# Patient Record
Sex: FEMALE | Race: WHITE | Employment: UNEMPLOYED | URBAN - METROPOLITAN AREA
[De-identification: names, ages, dates, MRNs, and addresses within clinical notes are randomized per-mention and may not be internally consistent; named-entity substitution may affect disease eponyms.]

---

## 2017-01-24 RX ORDER — LABETALOL 100 MG/1
TABLET, FILM COATED ORAL
Qty: 56 TABLET | Refills: 0 | Status: SHIPPED | OUTPATIENT
Start: 2017-01-24 | End: 2017-02-21 | Stop reason: SDUPTHER

## 2017-02-13 RX ORDER — LORATADINE AND PSEUDOEPHEDRINE SULFATE 10; 240 MG/1; MG/1
TABLET, FILM COATED, EXTENDED RELEASE ORAL
Qty: 30 TABLET | Refills: 4 | Status: SHIPPED | OUTPATIENT
Start: 2017-02-13

## 2017-02-21 RX ORDER — ZOLPIDEM TARTRATE 10 MG/1
TABLET ORAL
Qty: 28 TABLET | Refills: 3 | Status: SHIPPED | OUTPATIENT
Start: 2017-02-21 | End: 2017-04-12 | Stop reason: SDUPTHER

## 2017-02-21 RX ORDER — LABETALOL 100 MG/1
TABLET, FILM COATED ORAL
Qty: 56 TABLET | Refills: 5 | Status: ON HOLD | OUTPATIENT
Start: 2017-02-21 | End: 2022-06-30 | Stop reason: HOSPADM

## 2017-02-22 RX ORDER — ALPRAZOLAM 2 MG/1
TABLET, EXTENDED RELEASE ORAL
Qty: 28 TABLET | Refills: 2 | Status: SHIPPED | OUTPATIENT
Start: 2017-02-22 | End: 2019-09-01

## 2017-04-05 ENCOUNTER — TELEPHONE (OUTPATIENT)
Dept: INTERNAL MEDICINE CLINIC | Age: 82
End: 2017-04-05

## 2017-04-12 RX ORDER — ZOLPIDEM TARTRATE 10 MG/1
TABLET ORAL
Qty: 28 TABLET | Refills: 3 | Status: SHIPPED | OUTPATIENT
Start: 2017-04-12 | End: 2020-07-28 | Stop reason: CLARIF

## 2017-04-19 ASSESSMENT — ENCOUNTER SYMPTOMS
ABDOMINAL PAIN: 0
SHORTNESS OF BREATH: 0

## 2017-04-21 ENCOUNTER — OFFICE VISIT (OUTPATIENT)
Dept: INTERNAL MEDICINE CLINIC | Age: 82
End: 2017-04-21

## 2017-04-21 VITALS
BODY MASS INDEX: 30 KG/M2 | SYSTOLIC BLOOD PRESSURE: 130 MMHG | WEIGHT: 163 LBS | HEART RATE: 72 BPM | DIASTOLIC BLOOD PRESSURE: 62 MMHG | HEIGHT: 62 IN | RESPIRATION RATE: 16 BRPM

## 2017-04-21 DIAGNOSIS — E74.39 GLUCOSE INTOLERANCE (MALABSORPTION): Primary | ICD-10-CM

## 2017-04-21 DIAGNOSIS — I10 ESSENTIAL HYPERTENSION: ICD-10-CM

## 2017-04-21 DIAGNOSIS — Z00.00 PREVENTATIVE HEALTH CARE: ICD-10-CM

## 2017-04-21 DIAGNOSIS — E78.00 PURE HYPERCHOLESTEROLEMIA: ICD-10-CM

## 2017-04-21 PROCEDURE — G8420 CALC BMI NORM PARAMETERS: HCPCS | Performed by: INTERNAL MEDICINE

## 2017-04-21 PROCEDURE — 99214 OFFICE O/P EST MOD 30 MIN: CPT | Performed by: INTERNAL MEDICINE

## 2017-04-21 PROCEDURE — 1123F ACP DISCUSS/DSCN MKR DOCD: CPT | Performed by: INTERNAL MEDICINE

## 2017-04-21 PROCEDURE — G8427 DOCREV CUR MEDS BY ELIG CLIN: HCPCS | Performed by: INTERNAL MEDICINE

## 2017-04-21 PROCEDURE — 4040F PNEUMOC VAC/ADMIN/RCVD: CPT | Performed by: INTERNAL MEDICINE

## 2017-04-21 PROCEDURE — 1036F TOBACCO NON-USER: CPT | Performed by: INTERNAL MEDICINE

## 2017-04-21 PROCEDURE — 1090F PRES/ABSN URINE INCON ASSESS: CPT | Performed by: INTERNAL MEDICINE

## 2017-05-03 ENCOUNTER — TELEPHONE (OUTPATIENT)
Dept: INTERNAL MEDICINE CLINIC | Age: 82
End: 2017-05-03

## 2017-06-26 ENCOUNTER — TELEPHONE (OUTPATIENT)
Dept: INTERNAL MEDICINE CLINIC | Age: 82
End: 2017-06-26

## 2017-06-27 RX ORDER — ALPRAZOLAM 0.5 MG/1
TABLET ORAL
Qty: 100 TABLET | Refills: 5 | Status: SHIPPED | OUTPATIENT
Start: 2017-06-27 | End: 2019-09-01 | Stop reason: SDUPTHER

## 2017-07-28 ENCOUNTER — TELEPHONE (OUTPATIENT)
Dept: INTERNAL MEDICINE CLINIC | Age: 82
End: 2017-07-28

## 2017-10-10 ASSESSMENT — ENCOUNTER SYMPTOMS
SHORTNESS OF BREATH: 0
ABDOMINAL PAIN: 0

## 2017-10-13 ENCOUNTER — OFFICE VISIT (OUTPATIENT)
Dept: INTERNAL MEDICINE CLINIC | Age: 82
End: 2017-10-13

## 2017-10-13 VITALS
BODY MASS INDEX: 28.89 KG/M2 | HEART RATE: 76 BPM | SYSTOLIC BLOOD PRESSURE: 124 MMHG | HEIGHT: 62 IN | RESPIRATION RATE: 16 BRPM | DIASTOLIC BLOOD PRESSURE: 56 MMHG | WEIGHT: 157 LBS

## 2017-10-13 DIAGNOSIS — E78.00 PURE HYPERCHOLESTEROLEMIA: ICD-10-CM

## 2017-10-13 DIAGNOSIS — Z23 NEED FOR INFLUENZA VACCINATION: ICD-10-CM

## 2017-10-13 DIAGNOSIS — F33.41 MAJOR DEPRESSIVE DISORDER, RECURRENT, IN PARTIAL REMISSION (HCC): ICD-10-CM

## 2017-10-13 DIAGNOSIS — E74.39 GLUCOSE INTOLERANCE (MALABSORPTION): Primary | ICD-10-CM

## 2017-10-13 DIAGNOSIS — I10 ESSENTIAL HYPERTENSION: ICD-10-CM

## 2017-10-13 PROCEDURE — 1123F ACP DISCUSS/DSCN MKR DOCD: CPT | Performed by: INTERNAL MEDICINE

## 2017-10-13 PROCEDURE — G8417 CALC BMI ABV UP PARAM F/U: HCPCS | Performed by: INTERNAL MEDICINE

## 2017-10-13 PROCEDURE — 99214 OFFICE O/P EST MOD 30 MIN: CPT | Performed by: INTERNAL MEDICINE

## 2017-10-13 PROCEDURE — G8484 FLU IMMUNIZE NO ADMIN: HCPCS | Performed by: INTERNAL MEDICINE

## 2017-10-13 PROCEDURE — 1090F PRES/ABSN URINE INCON ASSESS: CPT | Performed by: INTERNAL MEDICINE

## 2017-10-13 PROCEDURE — 4040F PNEUMOC VAC/ADMIN/RCVD: CPT | Performed by: INTERNAL MEDICINE

## 2017-10-13 PROCEDURE — 90662 IIV NO PRSV INCREASED AG IM: CPT | Performed by: INTERNAL MEDICINE

## 2017-10-13 PROCEDURE — G8428 CUR MEDS NOT DOCUMENT: HCPCS | Performed by: INTERNAL MEDICINE

## 2017-10-13 PROCEDURE — 1036F TOBACCO NON-USER: CPT | Performed by: INTERNAL MEDICINE

## 2017-10-13 PROCEDURE — G0008 ADMIN INFLUENZA VIRUS VAC: HCPCS | Performed by: INTERNAL MEDICINE

## 2017-10-13 RX ORDER — ALPRAZOLAM 1 MG/1
1 TABLET ORAL NIGHTLY PRN
COMMUNITY
End: 2019-09-01

## 2017-10-13 RX ORDER — MIRTAZAPINE 7.5 MG/1
22.5 TABLET, FILM COATED ORAL NIGHTLY
COMMUNITY
End: 2021-08-20 | Stop reason: CLARIF

## 2017-12-07 ENCOUNTER — TELEPHONE (OUTPATIENT)
Dept: INTERNAL MEDICINE CLINIC | Age: 82
End: 2017-12-07

## 2017-12-07 NOTE — TELEPHONE ENCOUNTER
Called patient's daughter and gave her Dr. Judy Grande's contact information. Daughter is going to call a podiatrist in [place] [address] instead due to the more convenient office location.

## 2017-12-07 NOTE — TELEPHONE ENCOUNTER
pts dtr asking for a referral to a podiatrist for nail fungus and burning in feet    QK#[phone]

## 2018-04-04 ENCOUNTER — OFFICE VISIT (OUTPATIENT)
Dept: INTERNAL MEDICINE CLINIC | Age: 83
End: 2018-04-04

## 2018-04-04 VITALS
HEART RATE: 76 BPM | SYSTOLIC BLOOD PRESSURE: 122 MMHG | DIASTOLIC BLOOD PRESSURE: 62 MMHG | HEIGHT: 62 IN | RESPIRATION RATE: 16 BRPM | BODY MASS INDEX: 28.52 KG/M2 | WEIGHT: 155 LBS

## 2018-04-04 DIAGNOSIS — E74.39 GLUCOSE INTOLERANCE (MALABSORPTION): ICD-10-CM

## 2018-04-04 DIAGNOSIS — I10 ESSENTIAL HYPERTENSION: Primary | ICD-10-CM

## 2018-04-04 DIAGNOSIS — F32.5 MAJOR DEPRESSION, SINGLE EPISODE, IN COMPLETE REMISSION (HCC): ICD-10-CM

## 2018-04-04 DIAGNOSIS — E78.00 PURE HYPERCHOLESTEROLEMIA: ICD-10-CM

## 2018-04-04 PROCEDURE — 99214 OFFICE O/P EST MOD 30 MIN: CPT | Performed by: INTERNAL MEDICINE

## 2018-04-04 PROCEDURE — 4040F PNEUMOC VAC/ADMIN/RCVD: CPT | Performed by: INTERNAL MEDICINE

## 2018-04-04 PROCEDURE — G8428 CUR MEDS NOT DOCUMENT: HCPCS | Performed by: INTERNAL MEDICINE

## 2018-04-04 PROCEDURE — 1123F ACP DISCUSS/DSCN MKR DOCD: CPT | Performed by: INTERNAL MEDICINE

## 2018-04-04 PROCEDURE — 1036F TOBACCO NON-USER: CPT | Performed by: INTERNAL MEDICINE

## 2018-04-04 PROCEDURE — G8417 CALC BMI ABV UP PARAM F/U: HCPCS | Performed by: INTERNAL MEDICINE

## 2018-04-04 PROCEDURE — 1090F PRES/ABSN URINE INCON ASSESS: CPT | Performed by: INTERNAL MEDICINE

## 2018-04-04 ASSESSMENT — ENCOUNTER SYMPTOMS
ABDOMINAL PAIN: 0
SHORTNESS OF BREATH: 0

## 2018-05-16 ENCOUNTER — TELEPHONE (OUTPATIENT)
Dept: INTERNAL MEDICINE CLINIC | Age: 83
End: 2018-05-16

## 2018-06-15 ENCOUNTER — TELEPHONE (OUTPATIENT)
Dept: INTERNAL MEDICINE CLINIC | Age: 83
End: 2018-06-15

## 2019-03-26 PROBLEM — R73.09 ABNORMAL GLUCOSE: Status: ACTIVE | Noted: 2019-03-26

## 2019-04-16 ENCOUNTER — OFFICE VISIT (OUTPATIENT)
Dept: ENT CLINIC | Age: 84
End: 2019-04-16
Payer: COMMERCIAL

## 2019-04-16 VITALS
HEIGHT: 62 IN | DIASTOLIC BLOOD PRESSURE: 69 MMHG | WEIGHT: 153 LBS | HEART RATE: 74 BPM | SYSTOLIC BLOOD PRESSURE: 151 MMHG | BODY MASS INDEX: 28.16 KG/M2

## 2019-04-16 DIAGNOSIS — H61.23 BILATERAL IMPACTED CERUMEN: Primary | ICD-10-CM

## 2019-04-16 PROCEDURE — 69210 REMOVE IMPACTED EAR WAX UNI: CPT | Performed by: OTOLARYNGOLOGY

## 2019-04-16 NOTE — PROGRESS NOTES
Here for cerumen removal    Cerumen  Pre operative diagnosis: Cerumen impaction bilaterally  Post operative diagnosis: Same  Procedure: bilaterally cerumenectomy    After consent an operating microscope was utilized to visualize the external auditory canals bilaterally. Cerumen was removed with curettes and worley suctions on the both sides. The tympanic membrane is intact. No fluid visualized in the middle ear. No complications. I attest I performed the entire procedure myself.      Follow up as needed

## 2019-05-01 ENCOUNTER — TELEPHONE (OUTPATIENT)
Dept: INTERNAL MEDICINE CLINIC | Age: 84
End: 2019-05-01

## 2019-06-16 ENCOUNTER — HOSPITAL ENCOUNTER (EMERGENCY)
Age: 84
Discharge: HOME | End: 2019-06-16
Payer: COMMERCIAL

## 2019-06-16 VITALS
WEIGHT: 160.5 LBS | SYSTOLIC BLOOD PRESSURE: 172 MMHG | RESPIRATION RATE: 16 BRPM | BODY MASS INDEX: 28.98 KG/M2 | DIASTOLIC BLOOD PRESSURE: 63 MMHG | TEMPERATURE: 98.2 F | HEART RATE: 82 BPM | OXYGEN SATURATION: 94 %

## 2019-06-16 DIAGNOSIS — N30.00 ACUTE CYSTITIS WITHOUT HEMATURIA: Primary | ICD-10-CM

## 2019-06-16 DIAGNOSIS — F41.1 ANXIETY STATE: ICD-10-CM

## 2019-06-16 LAB
ANION GAP SERPL CALCULATED.3IONS-SCNC: 10 MMOL/L (ref 3–16)
BACTERIA: ABNORMAL /HPF
BASOPHILS ABSOLUTE: 0.1 K/UL (ref 0–0.2)
BASOPHILS RELATIVE PERCENT: 1.2 %
BILIRUBIN URINE: NEGATIVE
BLOOD, URINE: NEGATIVE
BUN BLDV-MCNC: 14 MG/DL (ref 7–20)
CALCIUM SERPL-MCNC: 8.4 MG/DL (ref 8.3–10.6)
CHLORIDE BLD-SCNC: 102 MMOL/L (ref 99–110)
CLARITY: ABNORMAL
CO2: 27 MMOL/L (ref 21–32)
COLOR: YELLOW
COMMENT UA: ABNORMAL
CREAT SERPL-MCNC: <0.5 MG/DL (ref 0.6–1.2)
EKG ATRIAL RATE: 72 BPM
EKG DIAGNOSIS: NORMAL
EKG P AXIS: 46 DEGREES
EKG P-R INTERVAL: 170 MS
EKG Q-T INTERVAL: 400 MS
EKG QRS DURATION: 78 MS
EKG QTC CALCULATION (BAZETT): 438 MS
EKG R AXIS: -18 DEGREES
EKG T AXIS: 16 DEGREES
EKG VENTRICULAR RATE: 72 BPM
EOSINOPHILS ABSOLUTE: 0.1 K/UL (ref 0–0.6)
EOSINOPHILS RELATIVE PERCENT: 1.6 %
EPITHELIAL CELLS, UA: 1 /HPF (ref 0–5)
GFR AFRICAN AMERICAN: >60
GFR NON-AFRICAN AMERICAN: >60
GLUCOSE BLD-MCNC: 91 MG/DL (ref 70–99)
GLUCOSE URINE: NEGATIVE MG/DL
HCT VFR BLD CALC: 38.4 % (ref 36–48)
HEMOGLOBIN: 12.8 G/DL (ref 12–16)
HYALINE CASTS: 2 /LPF (ref 0–8)
KETONES, URINE: NEGATIVE MG/DL
LEUKOCYTE ESTERASE, URINE: ABNORMAL
LYMPHOCYTES ABSOLUTE: 1.1 K/UL (ref 1–5.1)
LYMPHOCYTES RELATIVE PERCENT: 20.7 %
MCH RBC QN AUTO: 31.7 PG (ref 26–34)
MCHC RBC AUTO-ENTMCNC: 33.4 G/DL (ref 31–36)
MCV RBC AUTO: 94.8 FL (ref 80–100)
MICROSCOPIC EXAMINATION: YES
MONOCYTES ABSOLUTE: 0.7 K/UL (ref 0–1.3)
MONOCYTES RELATIVE PERCENT: 13 %
NEUTROPHILS ABSOLUTE: 3.4 K/UL (ref 1.7–7.7)
NEUTROPHILS RELATIVE PERCENT: 63.5 %
NITRITE, URINE: NEGATIVE
PDW BLD-RTO: 12.8 % (ref 12.4–15.4)
PH UA: 7.5 (ref 5–8)
PLATELET # BLD: 181 K/UL (ref 135–450)
PMV BLD AUTO: 6.9 FL (ref 5–10.5)
POTASSIUM REFLEX MAGNESIUM: 4.1 MMOL/L (ref 3.5–5.1)
PROTEIN UA: NEGATIVE MG/DL
RBC # BLD: 4.05 M/UL (ref 4–5.2)
RBC UA: 1 /HPF (ref 0–4)
SODIUM BLD-SCNC: 139 MMOL/L (ref 136–145)
SPECIFIC GRAVITY UA: 1.01 (ref 1–1.03)
TROPONIN: <0.01 NG/ML
URINE REFLEX TO CULTURE: YES
URINE TYPE: ABNORMAL
UROBILINOGEN, URINE: 0.2 E.U./DL
WBC # BLD: 5.4 K/UL (ref 4–11)
WBC UA: 9 /HPF (ref 0–5)

## 2019-06-16 PROCEDURE — 36415 COLL VENOUS BLD VENIPUNCTURE: CPT

## 2019-06-16 PROCEDURE — 84484 ASSAY OF TROPONIN QUANT: CPT

## 2019-06-16 PROCEDURE — 99284 EMERGENCY DEPT VISIT MOD MDM: CPT

## 2019-06-16 PROCEDURE — 93005 ELECTROCARDIOGRAM TRACING: CPT | Performed by: PHYSICIAN ASSISTANT

## 2019-06-16 PROCEDURE — 81001 URINALYSIS AUTO W/SCOPE: CPT

## 2019-06-16 PROCEDURE — 80048 BASIC METABOLIC PNL TOTAL CA: CPT

## 2019-06-16 PROCEDURE — 87086 URINE CULTURE/COLONY COUNT: CPT

## 2019-06-16 PROCEDURE — 85025 COMPLETE CBC W/AUTO DIFF WBC: CPT

## 2019-06-16 PROCEDURE — 93010 ELECTROCARDIOGRAM REPORT: CPT | Performed by: INTERNAL MEDICINE

## 2019-06-16 RX ORDER — SULFAMETHOXAZOLE AND TRIMETHOPRIM 800; 160 MG/1; MG/1
1 TABLET ORAL 2 TIMES DAILY
Qty: 14 TABLET | Refills: 0 | Status: SHIPPED | OUTPATIENT
Start: 2019-06-16 | End: 2019-06-23

## 2019-06-16 ASSESSMENT — ENCOUNTER SYMPTOMS
COLOR CHANGE: 0
SHORTNESS OF BREATH: 0
VOMITING: 0
NAUSEA: 0
ABDOMINAL PAIN: 0
CHEST TIGHTNESS: 0

## 2019-06-16 NOTE — ED NOTES
Attempted to call patient's family, per pt request. No answer.      Betsey Gallego, RN  06/16/19 9029

## 2019-06-16 NOTE — ED PROVIDER NOTES
EKG shows NSR LAD no ectopy no acute st changes     Was asked to dictate EKG by mid level provider      Melvina Argueta MD  06/16/19 2318

## 2019-06-16 NOTE — ED NOTES
Patient verbalized understanding of discharge instructions and follow-up care. Patient was given 1 prescription and verbalized understanding of instructions for said prescription. Breathing unlabored, skin warm, patient alert. Patient wheeled out of emergency department with her daughter, back to [facility].      Nidia Salinas RN  06/16/19 8871

## 2019-06-16 NOTE — ED NOTES
Pt daughter Macrina Ponce called and states she is on her was to  the patient.      Iqra Olivera RN  06/16/19 8337

## 2019-06-16 NOTE — ED PROVIDER NOTES
[address]  [address] [facility] [address]  Dept: [phone]  Loc: [phone]  eMERGENCYdEPARTMENT eNCOUnter      Pt Name: Gucci Martinez  MRN: [id number]  Mikogfchaz 7/13/1925  Date of evaluation: 6/16/2019  Provider:Carmen Allan PA-C    CHIEF COMPLAINT       Chief Complaint   Patient presents with    Headache    Palpitations    Fatigue         HISTORY OF PRESENT ILLNESS  (Location/Symptom, Timing/Onset, Context/Setting, Quality, Duration,Modifying Factors, Severity.)   Gucci Martinez is a 80 y.o. female who presents to the emergency department by EMS complaining of scared feeling. Patient states she was having a bad dream about 1 of her children when she woke up this morning. States she woke up feeling slightly scared and this was concerning to her. Typically does not feel fearful and EMS was called. She denies any chest pain, shortness of breath, palpitations, nausea, vomiting. Initially complained of a slight headache, denies headache to myself bedside. No visual disturbance. Complains of dry mouth, this is chronic in nature per patient. Denies any physical complaints at this time. No pain. Nursing Notes were reviewedand agreed with or any disagreements were addressed in the HPI. REVIEW OF SYSTEMS    (2-9 systems for level 4, 10 or more for level 5)     Review of Systems   Constitutional: Negative for chills and fever. HENT: Negative. Respiratory: Negative for chest tightness and shortness of breath. Cardiovascular: Negative. Gastrointestinal: Negative for abdominal pain, nausea and vomiting. Genitourinary: Negative. Skin: Negative for color change, pallor and rash. Neurological: Negative for dizziness and light-headedness. Psychiatric/Behavioral: Negative for behavioral problems and confusion.        Except as noted above the remainder of the review of systems was reviewed and negative. PAST MEDICAL HISTORY         Diagnosis Date    Hematuria, microscopic        SURGICAL HISTORY           Procedure Laterality Date    COLONOSCOPY      Dr. Maile Flores said it is not necessary to have anymore    HYSTERECTOMY      TONSILLECTOMY AND ADENOIDECTOMY         CURRENT MEDICATIONS     [unfilled]    ALLERGIES     Penicillins    FAMILY HISTORY           Problem Relation Age of Onset    Depression Mother      Family Status   Relation Name Status    Mother   at age late 66's        Heart disease    Father   at age early 61 's        Heart disease    Other  (Not Specified)        SOCIAL HISTORY      reports that she has never smoked. She has never used smokeless tobacco. She reports that she does not use drugs. PHYSICAL EXAM    (up to 7 for level 4, 8 or more for level 5)     ED Triage Vitals [19 0652]   Enc Vitals Group      BP (!) 173/63      Pulse 71      Resp 16      Temp 98.2 °F (36.8 °C)      Temp Source Oral      SpO2 96 %      Weight 160 lb 7.9 oz (72.8 kg)      Height       Head Circumference       Peak Flow       Pain Score       Pain Loc       Pain Edu? Excl. in [address]? Physical Exam   Constitutional: She is oriented to person, place, and time. She appears well-developed and well-nourished. HENT:   Head: Normocephalic and atraumatic. Eyes: Conjunctivae and EOM are normal.   Neck: Normal range of motion. Neck supple. Cardiovascular: Normal rate and regular rhythm. Pulmonary/Chest: Effort normal. No respiratory distress. Musculoskeletal: Normal range of motion. Neurological: She is alert and oriented to person, place, and time. Skin: Skin is warm. She is not diaphoretic. No erythema. Psychiatric: She has a normal mood and affect.  Her behavior is normal.         DIAGNOSTIC RESULTS     EKG: All EKG's are interpreted by the Emergency Department Physician who either signs or Co-signs this chart in the absence of a cardiologist.    RADIOLOGY:   Non-plain film images such as CT, Ultrasound and MRI are read by the radiologist. Plain radiographic images are visualized and preliminarilyinterpreted by the emergency physician with the below findings:    Interpretation per the Radiologist below,if available at the time of this note:    No orders to display         LABS:  Labs Reviewed   BASIC METABOLIC PANEL W/ REFLEX TO MG FOR LOW K - Abnormal; Notable for the following components:       Result Value    CREATININE <0.5 (*)     All other components within normal limits    Narrative:     Performed at:  [address] [employer]   Phone [phone]   URINE RT REFLEX TO CULTURE - Abnormal; Notable for the following components:    Clarity, UA CLOUDY (*)     Leukocyte Esterase, Urine SMALL (*)     All other components within normal limits    Narrative:     Performed at:  [address] [employer]   Phone [phone]   MICROSCOPIC URINALYSIS - Abnormal; Notable for the following components:    Bacteria, UA 2+ (*)     WBC, UA 9 (*)     All other components within normal limits    Narrative:     Performed at:  [address] [employer] [address]   Phone [phone]   URINE CULTURE   CBC WITH AUTO DIFFERENTIAL    Narrative:     Performed at:  [address] [employer] [address]   Phone [phone]   TROPONIN    Narrative:     Performed at:  [facility]  [address] [place] [address]   Phone [phone]       All other labs were within normal range or not returned as of this dictation.     EMERGENCY DEPARTMENT COURSE and DIFFERENTIAL DIAGNOSIS/MDM:   Vitals:    Vitals:    06/16/19 0701 06/16/19 0801 06/16/19 0831 06/16/19 0855   BP:  (!) 172/61 (!) 172/63 Pulse: 71 81 72 82   Resp:  17 16 16   Temp:       TempSrc:       SpO2:  92% 92% 94%   Weight:           MDM     Patient presents to the ED with HPI noted above. Blood pressure elevated upon arrival 173/63. She is afebrile and nontoxic-appearing. She is not hypoxic with oxygen saturation 96% on room air. Patient apart from feeling fearful from dream denies any complaints at this time. Basic labs obtained. EKG was obtained given initial report of tightness all over. Patient denies any tightness and states she is feels slightly scared. Denies any shortness of breath, chest pain, nausea, vomiting, palpitations. Will continue to observe in the ED. at reevaluation patient states she feels significantly better. States she was having multiple dreams and woke up feeling anxious. States all symptoms have resolved at this time. Again denies any symptoms at reevaluation. CBC showed no leukocytosis or anemia. BMP showed no lateral abnormality, no renal impairment. Urine showed 9 WBCs and 2+ bacteria, minimal epithelial cells. Given odd dreams, patient age and urinalysis will treat with antibiotics. Bactrim prescribed given documented allergies. EKG was obtained upon arrival given medics chief complaint of tightness all over and fatigue. EKG showed no STEMI. Troponin within normal limits. Patient denies any symptoms of my bedside evaluation or at reevaluation apart from odd dreams and fearful feeling from dreams. Given she is asymptomatic no additional work-up indicated at this time. Patient told return to ED should she have any recurrence of symptoms or other concerning symptoms. Discharged home in stable condition. Patient comfortable with plan. Did not want me to contact family regarding visit. Will contact family for transport back to [facility]. The patient tolerated their visit well. addressed the patient's questions and concerns.  Important warning signs as well as new or worsening symptoms which would necessitate immediate return to the ED were discussed. The plan is to discharge from the ED at this time, and the patient is in stable condition. The patient acknowledged understanding is agreeable with this plan. CONSULTS:  None    PROCEDURES:  Procedures    FINAL IMPRESSION      1. Acute cystitis without hematuria    2. Anxiety state          DISPOSITION/PLAN   [unfilled]    PATIENT REFERRED TO:  [facility] Emergency Department  [address] [id number]  [phone]  Go to   If symptoms worsen    Derik Dolan MD  [facility] [address] [facility]  [phone]    Schedule an appointment as soon as possible for a visit in 1 week  For follow up and reevaluation.       DISCHARGE MEDICATIONS:  New Prescriptions    SULFAMETHOXAZOLE-TRIMETHOPRIM (BACTRIM DS) 800-160 MG PER TABLET    Take 1 tablet by mouth 2 times daily for 7 days       (Please note that portions of this note were completed with a voice recognition program.  Efforts were made to edit the dictations but occasionally words are mis-transcribed.)    [address] [facility]          [address] [place], [place]  06/16/19 4225

## 2019-06-16 NOTE — ED NOTES
Call out to [facility] to get more information on the patient. Spoke with Ninfa.      Lona Murguia RN  06/16/19 7271

## 2019-06-16 NOTE — ED NOTES
Bed: B-06  Expected date:   Expected time:   Means of arrival: [employer] [facility]  Comments:  89F tightness all over body     Kemar Patrick RN  06/16/19 6740

## 2019-06-17 LAB — URINE CULTURE, ROUTINE: NORMAL

## 2020-04-24 LAB
SARS-COV-2: NOT DETECTED
SOURCE: NORMAL

## 2021-02-19 DIAGNOSIS — R73.09 ABNORMAL GLUCOSE: ICD-10-CM

## 2021-02-19 DIAGNOSIS — I10 ESSENTIAL HYPERTENSION: ICD-10-CM

## 2021-02-19 LAB
A/G RATIO: 1.6 (ref 1.1–2.2)
ALBUMIN SERPL-MCNC: 4.1 G/DL (ref 3.4–5)
ALP BLD-CCNC: 68 U/L (ref 40–129)
ALT SERPL-CCNC: 10 U/L (ref 10–40)
ANION GAP SERPL CALCULATED.3IONS-SCNC: 11 MMOL/L (ref 3–16)
AST SERPL-CCNC: 20 U/L (ref 15–37)
BILIRUB SERPL-MCNC: 0.3 MG/DL (ref 0–1)
BUN BLDV-MCNC: 19 MG/DL (ref 7–20)
CALCIUM SERPL-MCNC: 9.2 MG/DL (ref 8.3–10.6)
CHLORIDE BLD-SCNC: 103 MMOL/L (ref 99–110)
CO2: 27 MMOL/L (ref 21–32)
CREAT SERPL-MCNC: 0.6 MG/DL (ref 0.6–1.2)
GFR AFRICAN AMERICAN: >60
GFR NON-AFRICAN AMERICAN: >60
GLOBULIN: 2.6 G/DL
GLUCOSE BLD-MCNC: 137 MG/DL (ref 70–99)
POTASSIUM SERPL-SCNC: 4.6 MMOL/L (ref 3.5–5.1)
SODIUM BLD-SCNC: 141 MMOL/L (ref 136–145)
TOTAL PROTEIN: 6.7 G/DL (ref 6.4–8.2)

## 2021-02-20 LAB
ESTIMATED AVERAGE GLUCOSE: 99.7 MG/DL
HBA1C MFR BLD: 5.1 %

## 2021-05-09 ENCOUNTER — TELEPHONE (OUTPATIENT)
Dept: INTERNAL MEDICINE CLINIC | Age: 86
End: 2021-05-09

## 2021-05-09 NOTE — TELEPHONE ENCOUNTER
I spoke with Madonna Junior from [facility] regarding patient. She erroneously received lisinopril, some vitamins Zoloft and BuSpar. Her regularly scheduled amlodipine and labetalol were held today. She is asymptomatic currently.

## 2021-07-03 DIAGNOSIS — H61.23 CERUMEN DEBRIS ON TYMPANIC MEMBRANE, BILATERAL: Primary | ICD-10-CM

## 2021-07-03 NOTE — PROGRESS NOTES
The patient's daughter Marisela Morrison fall because her mother cannot hear. Previously she has had impacted cerumen and needed ENT to irrigate ears. She asked how this can be resolved over the weekend. To see a provider I think she would have to go to the emergency department. She was not interested in doing this. I will send Debrox drops to her assisted living. She says she will contact office on Tuesday for follow-up.

## 2021-07-07 ENCOUNTER — OFFICE VISIT (OUTPATIENT)
Dept: ENT CLINIC | Age: 86
End: 2021-07-07
Payer: COMMERCIAL

## 2021-07-07 VITALS — HEART RATE: 66 BPM | SYSTOLIC BLOOD PRESSURE: 136 MMHG | DIASTOLIC BLOOD PRESSURE: 69 MMHG

## 2021-07-07 DIAGNOSIS — H61.23 BILATERAL IMPACTED CERUMEN: Primary | ICD-10-CM

## 2021-07-07 DIAGNOSIS — H91.93 BILATERAL HEARING LOSS, UNSPECIFIED HEARING LOSS TYPE: ICD-10-CM

## 2021-07-07 PROCEDURE — 69210 REMOVE IMPACTED EAR WAX UNI: CPT | Performed by: OTOLARYNGOLOGY

## 2021-07-07 NOTE — PROGRESS NOTES
Patient presents for evaluation of cerumen impactions. Has had to have it cleaned in the past.  According to her daughter she cannot hear anything. Has not had a hearing test.  Her daughter does not think she would benefit from a hearing test because with her dementia she does not think she could deal with hearing aids anyway    Procedure  The right ear is visualized binoculars scope. Patient had a dense cerumen impaction that I debrided with a Rhodes suction. She has very narrow ear canals. Tympanic membrane appear to be intact and aerated middle ear    On the left side similar exam findings with a dense cerumen impaction. Very narrow canals. I was able to remove this with a Rhodes suction. Plan  Patient had fairly severe cerumen impactions. Subjectively she was hearing significantly better right after we remove them. I did talk to the family about getting a hearing test to see if she would need hearing aids, but the daughter does not think she would tolerate this anyway. I would like to see her in 6 months just to clean her ears out again as it would be more comfortable to do before gets this bad.

## 2021-12-15 ENCOUNTER — OFFICE VISIT (OUTPATIENT)
Dept: ENT CLINIC | Age: 86
End: 2021-12-15
Payer: COMMERCIAL

## 2021-12-15 VITALS — SYSTOLIC BLOOD PRESSURE: 118 MMHG | DIASTOLIC BLOOD PRESSURE: 66 MMHG | TEMPERATURE: 97.7 F | HEART RATE: 64 BPM

## 2021-12-15 DIAGNOSIS — H61.23 BILATERAL IMPACTED CERUMEN: Primary | ICD-10-CM

## 2021-12-15 PROCEDURE — 69210 REMOVE IMPACTED EAR WAX UNI: CPT | Performed by: OTOLARYNGOLOGY

## 2021-12-15 NOTE — PROGRESS NOTES
Patient is following up for cerumen impactions. She was scheduled to follow-up next month, but started having a lot of trouble hearing. Procedure  Bilateral ear exam with cerumen removal  Right ears visualized binoculars scope. A dense cerumen impaction was removed with a Rhodes suction. Tympanic membrane intact and aerated middle ear    On the left side again there was a dense cerumen impaction that I removed with a Rhodes suction.   Tympanic membrane intact with an aerated middle ear    Plan  Follow-up in 6 months, sooner if needed

## 2022-05-18 ENCOUNTER — OFFICE VISIT (OUTPATIENT)
Dept: ENT CLINIC | Age: 87
End: 2022-05-18
Payer: COMMERCIAL

## 2022-05-18 VITALS
DIASTOLIC BLOOD PRESSURE: 71 MMHG | HEIGHT: 62 IN | HEART RATE: 64 BPM | BODY MASS INDEX: 25.28 KG/M2 | SYSTOLIC BLOOD PRESSURE: 137 MMHG

## 2022-05-18 DIAGNOSIS — H61.23 BILATERAL IMPACTED CERUMEN: Primary | ICD-10-CM

## 2022-05-18 PROCEDURE — 69210 REMOVE IMPACTED EAR WAX UNI: CPT | Performed by: OTOLARYNGOLOGY

## 2022-05-18 NOTE — PROGRESS NOTES
Patient is following up for her cerumen impactions. I saw her last on December 15, 2021. No new complaints, but her daughter does think her cerumen impactions are back    Procedure  Bilateral ear exam and cerumen removal  Right ear is visualized binoculars scope. A cerumen impaction was removed with a Rhodes suction and alligator forcep. Tympanic membrane intact with an aerated middle ear    On the left side again there was a cerumen packs to the area with a Rhodes suction. Tympanic membrane intact with an aerated middle ear    Plan  Cerumen the patient is removed today in clinic.   Follow-up in 6 months

## 2022-06-26 ENCOUNTER — APPOINTMENT (OUTPATIENT)
Dept: CT IMAGING | Age: 87
DRG: 065 | End: 2022-06-26
Payer: COMMERCIAL

## 2022-06-26 ENCOUNTER — HOSPITAL ENCOUNTER (INPATIENT)
Age: 87
LOS: 4 days | Discharge: NURSING, REHAB OR LONG-TERM CARE | DRG: 065 | End: 2022-06-30
Attending: EMERGENCY MEDICINE | Admitting: INTERNAL MEDICINE
Payer: COMMERCIAL

## 2022-06-26 ENCOUNTER — APPOINTMENT (OUTPATIENT)
Dept: GENERAL RADIOLOGY | Age: 87
DRG: 065 | End: 2022-06-26
Payer: COMMERCIAL

## 2022-06-26 DIAGNOSIS — I63.9 CEREBROVASCULAR ACCIDENT (CVA), UNSPECIFIED MECHANISM (HCC): Primary | ICD-10-CM

## 2022-06-26 DIAGNOSIS — F41.9 ANXIETY: ICD-10-CM

## 2022-06-26 PROBLEM — R47.01 EXPRESSIVE APHASIA: Status: ACTIVE | Noted: 2022-06-26

## 2022-06-26 LAB
A/G RATIO: 1.4 (ref 1.1–2.2)
ALBUMIN SERPL-MCNC: 3.6 G/DL (ref 3.4–5)
ALP BLD-CCNC: 61 U/L (ref 40–129)
ALT SERPL-CCNC: 7 U/L (ref 10–40)
ANION GAP SERPL CALCULATED.3IONS-SCNC: 8 MMOL/L (ref 3–16)
AST SERPL-CCNC: 18 U/L (ref 15–37)
BASE EXCESS VENOUS: 4.9 MMOL/L
BASOPHILS ABSOLUTE: 0 K/UL (ref 0–0.2)
BASOPHILS RELATIVE PERCENT: 0.7 %
BILIRUB SERPL-MCNC: 0.3 MG/DL (ref 0–1)
BILIRUBIN URINE: NEGATIVE
BLOOD, URINE: NEGATIVE
BUN BLDV-MCNC: 12 MG/DL (ref 7–20)
CALCIUM SERPL-MCNC: 8.6 MG/DL (ref 8.3–10.6)
CARBOXYHEMOGLOBIN: 1.3 %
CHLORIDE BLD-SCNC: 97 MMOL/L (ref 99–110)
CLARITY: CLEAR
CO2: 27 MMOL/L (ref 21–32)
COLOR: YELLOW
CREAT SERPL-MCNC: 0.6 MG/DL (ref 0.6–1.2)
EOSINOPHILS ABSOLUTE: 0.1 K/UL (ref 0–0.6)
EOSINOPHILS RELATIVE PERCENT: 1.6 %
GFR AFRICAN AMERICAN: >60
GFR NON-AFRICAN AMERICAN: >60
GLUCOSE BLD-MCNC: 106 MG/DL (ref 70–99)
GLUCOSE URINE: NEGATIVE MG/DL
HCO3 VENOUS: 29 MMOL/L (ref 23–29)
HCT VFR BLD CALC: 38.1 % (ref 36–48)
HEMOGLOBIN: 12.8 G/DL (ref 12–16)
KETONES, URINE: NEGATIVE MG/DL
LEUKOCYTE ESTERASE, URINE: NEGATIVE
LYMPHOCYTES ABSOLUTE: 1.1 K/UL (ref 1–5.1)
LYMPHOCYTES RELATIVE PERCENT: 16.7 %
MCH RBC QN AUTO: 32 PG (ref 26–34)
MCHC RBC AUTO-ENTMCNC: 33.7 G/DL (ref 31–36)
MCV RBC AUTO: 94.9 FL (ref 80–100)
METHEMOGLOBIN VENOUS: 0.5 %
MICROSCOPIC EXAMINATION: NORMAL
MONOCYTES ABSOLUTE: 0.9 K/UL (ref 0–1.3)
MONOCYTES RELATIVE PERCENT: 14 %
NEUTROPHILS ABSOLUTE: 4.4 K/UL (ref 1.7–7.7)
NEUTROPHILS RELATIVE PERCENT: 67 %
NITRITE, URINE: NEGATIVE
O2 SAT, VEN: 86 %
O2 THERAPY: ABNORMAL
PCO2, VEN: 41.8 MMHG (ref 40–50)
PDW BLD-RTO: 13.1 % (ref 12.4–15.4)
PH UA: 5 (ref 5–8)
PH VENOUS: 7.45 (ref 7.35–7.45)
PLATELET # BLD: 269 K/UL (ref 135–450)
PMV BLD AUTO: 6.9 FL (ref 5–10.5)
PO2, VEN: 50 MMHG
POTASSIUM SERPL-SCNC: 4.5 MMOL/L (ref 3.5–5.1)
PRO-BNP: 1196 PG/ML (ref 0–449)
PROTEIN UA: NEGATIVE MG/DL
RBC # BLD: 4.02 M/UL (ref 4–5.2)
SODIUM BLD-SCNC: 132 MMOL/L (ref 136–145)
SPECIFIC GRAVITY UA: 1.01 (ref 1–1.03)
TCO2 CALC VENOUS: 31 MMOL/L
TOTAL PROTEIN: 6.1 G/DL (ref 6.4–8.2)
TROPONIN: 0.01 NG/ML
URINE REFLEX TO CULTURE: NORMAL
URINE TYPE: NORMAL
UROBILINOGEN, URINE: 0.2 E.U./DL
WBC # BLD: 6.6 K/UL (ref 4–11)

## 2022-06-26 PROCEDURE — 36415 COLL VENOUS BLD VENIPUNCTURE: CPT

## 2022-06-26 PROCEDURE — 84484 ASSAY OF TROPONIN QUANT: CPT

## 2022-06-26 PROCEDURE — 1200000000 HC SEMI PRIVATE

## 2022-06-26 PROCEDURE — 85025 COMPLETE CBC W/AUTO DIFF WBC: CPT

## 2022-06-26 PROCEDURE — 93005 ELECTROCARDIOGRAM TRACING: CPT | Performed by: EMERGENCY MEDICINE

## 2022-06-26 PROCEDURE — 80053 COMPREHEN METABOLIC PANEL: CPT

## 2022-06-26 PROCEDURE — 99223 1ST HOSP IP/OBS HIGH 75: CPT | Performed by: INTERNAL MEDICINE

## 2022-06-26 PROCEDURE — 80175 DRUG SCREEN QUAN LAMOTRIGINE: CPT

## 2022-06-26 PROCEDURE — 51701 INSERT BLADDER CATHETER: CPT

## 2022-06-26 PROCEDURE — 99285 EMERGENCY DEPT VISIT HI MDM: CPT

## 2022-06-26 PROCEDURE — 71045 X-RAY EXAM CHEST 1 VIEW: CPT

## 2022-06-26 PROCEDURE — 70450 CT HEAD/BRAIN W/O DYE: CPT

## 2022-06-26 PROCEDURE — 82803 BLOOD GASES ANY COMBINATION: CPT

## 2022-06-26 PROCEDURE — 81003 URINALYSIS AUTO W/O SCOPE: CPT

## 2022-06-26 PROCEDURE — 83880 ASSAY OF NATRIURETIC PEPTIDE: CPT

## 2022-06-26 RX ORDER — AMLODIPINE BESYLATE 5 MG/1
5 TABLET ORAL DAILY
Status: DISCONTINUED | OUTPATIENT
Start: 2022-06-27 | End: 2022-06-30 | Stop reason: HOSPADM

## 2022-06-26 RX ORDER — ONDANSETRON 4 MG/1
4 TABLET, ORALLY DISINTEGRATING ORAL EVERY 8 HOURS PRN
Status: DISCONTINUED | OUTPATIENT
Start: 2022-06-26 | End: 2022-06-30 | Stop reason: HOSPADM

## 2022-06-26 RX ORDER — OXCARBAZEPINE 300 MG/1
300 TABLET, FILM COATED ORAL 2 TIMES DAILY
Status: DISCONTINUED | OUTPATIENT
Start: 2022-06-27 | End: 2022-06-30 | Stop reason: HOSPADM

## 2022-06-26 RX ORDER — ASPIRIN 300 MG/1
300 SUPPOSITORY RECTAL DAILY
Status: DISCONTINUED | OUTPATIENT
Start: 2022-06-27 | End: 2022-06-30 | Stop reason: HOSPADM

## 2022-06-26 RX ORDER — LAMOTRIGINE 25 MG/1
50 TABLET ORAL DAILY
Status: DISCONTINUED | OUTPATIENT
Start: 2022-06-27 | End: 2022-06-30 | Stop reason: HOSPADM

## 2022-06-26 RX ORDER — LABETALOL 100 MG/1
100 TABLET, FILM COATED ORAL EVERY 12 HOURS SCHEDULED
Status: DISCONTINUED | OUTPATIENT
Start: 2022-06-27 | End: 2022-06-30 | Stop reason: HOSPADM

## 2022-06-26 RX ORDER — LABETALOL HYDROCHLORIDE 5 MG/ML
10 INJECTION, SOLUTION INTRAVENOUS EVERY 10 MIN PRN
Status: DISCONTINUED | OUTPATIENT
Start: 2022-06-26 | End: 2022-06-30 | Stop reason: HOSPADM

## 2022-06-26 RX ORDER — ONDANSETRON 2 MG/ML
4 INJECTION INTRAMUSCULAR; INTRAVENOUS EVERY 6 HOURS PRN
Status: DISCONTINUED | OUTPATIENT
Start: 2022-06-26 | End: 2022-06-30 | Stop reason: HOSPADM

## 2022-06-26 RX ORDER — ATORVASTATIN CALCIUM 80 MG/1
80 TABLET, FILM COATED ORAL NIGHTLY
Status: DISCONTINUED | OUTPATIENT
Start: 2022-06-27 | End: 2022-06-30 | Stop reason: HOSPADM

## 2022-06-26 RX ORDER — ENOXAPARIN SODIUM 100 MG/ML
40 INJECTION SUBCUTANEOUS DAILY
Status: DISCONTINUED | OUTPATIENT
Start: 2022-06-27 | End: 2022-06-30 | Stop reason: HOSPADM

## 2022-06-26 RX ORDER — POLYETHYLENE GLYCOL 3350 17 G/17G
17 POWDER, FOR SOLUTION ORAL DAILY PRN
Status: DISCONTINUED | OUTPATIENT
Start: 2022-06-26 | End: 2022-06-30 | Stop reason: HOSPADM

## 2022-06-26 RX ORDER — ASPIRIN 81 MG/1
81 TABLET ORAL DAILY
Status: DISCONTINUED | OUTPATIENT
Start: 2022-06-27 | End: 2022-06-30 | Stop reason: HOSPADM

## 2022-06-26 ASSESSMENT — PAIN - FUNCTIONAL ASSESSMENT: PAIN_FUNCTIONAL_ASSESSMENT: NONE - DENIES PAIN

## 2022-06-26 ASSESSMENT — PAIN SCALES - WONG BAKER: WONGBAKER_NUMERICALRESPONSE: 0

## 2022-06-26 NOTE — ED TRIAGE NOTES
Pt brought in by [facility] [employer] from [facility] for right sided weakness that started yesterday. States pt fell a week ago Friday. States pt has pneumonia and was previously treated. Went nonverbal yesterday morning and started with right sided weakness.

## 2022-06-27 ENCOUNTER — APPOINTMENT (OUTPATIENT)
Dept: MRI IMAGING | Age: 87
DRG: 065 | End: 2022-06-27
Payer: COMMERCIAL

## 2022-06-27 LAB
ANION GAP SERPL CALCULATED.3IONS-SCNC: 10 MMOL/L (ref 3–16)
BUN BLDV-MCNC: 13 MG/DL (ref 7–20)
CALCIUM SERPL-MCNC: 8.7 MG/DL (ref 8.3–10.6)
CHLORIDE BLD-SCNC: 102 MMOL/L (ref 99–110)
CHOLESTEROL, TOTAL: 174 MG/DL (ref 0–199)
CO2: 25 MMOL/L (ref 21–32)
CREAT SERPL-MCNC: 0.5 MG/DL (ref 0.6–1.2)
EKG ATRIAL RATE: 87 BPM
EKG DIAGNOSIS: NORMAL
EKG P AXIS: 35 DEGREES
EKG P-R INTERVAL: 162 MS
EKG Q-T INTERVAL: 370 MS
EKG QRS DURATION: 78 MS
EKG QTC CALCULATION (BAZETT): 445 MS
EKG R AXIS: 0 DEGREES
EKG T AXIS: 18 DEGREES
EKG VENTRICULAR RATE: 87 BPM
ESTIMATED AVERAGE GLUCOSE: 108.3 MG/DL
GFR AFRICAN AMERICAN: >60
GFR NON-AFRICAN AMERICAN: >60
GLUCOSE BLD-MCNC: 108 MG/DL (ref 70–99)
HBA1C MFR BLD: 5.4 %
HCT VFR BLD CALC: 37.2 % (ref 36–48)
HDLC SERPL-MCNC: 38 MG/DL (ref 40–60)
HEMOGLOBIN: 12.7 G/DL (ref 12–16)
LDL CHOLESTEROL CALCULATED: 112 MG/DL
LV EF: 58 %
LVEF MODALITY: NORMAL
MCH RBC QN AUTO: 32.3 PG (ref 26–34)
MCHC RBC AUTO-ENTMCNC: 34.1 G/DL (ref 31–36)
MCV RBC AUTO: 94.7 FL (ref 80–100)
PDW BLD-RTO: 12.7 % (ref 12.4–15.4)
PLATELET # BLD: 281 K/UL (ref 135–450)
PMV BLD AUTO: 6.7 FL (ref 5–10.5)
POTASSIUM REFLEX MAGNESIUM: 4.6 MMOL/L (ref 3.5–5.1)
RBC # BLD: 3.93 M/UL (ref 4–5.2)
SODIUM BLD-SCNC: 137 MMOL/L (ref 136–145)
TRIGL SERPL-MCNC: 119 MG/DL (ref 0–150)
VLDLC SERPL CALC-MCNC: 24 MG/DL
WBC # BLD: 8.4 K/UL (ref 4–11)

## 2022-06-27 PROCEDURE — 97166 OT EVAL MOD COMPLEX 45 MIN: CPT

## 2022-06-27 PROCEDURE — 36415 COLL VENOUS BLD VENIPUNCTURE: CPT

## 2022-06-27 PROCEDURE — 80175 DRUG SCREEN QUAN LAMOTRIGINE: CPT

## 2022-06-27 PROCEDURE — 1200000000 HC SEMI PRIVATE

## 2022-06-27 PROCEDURE — 92523 SPEECH SOUND LANG COMPREHEN: CPT

## 2022-06-27 PROCEDURE — 97530 THERAPEUTIC ACTIVITIES: CPT | Performed by: PHYSICAL THERAPIST

## 2022-06-27 PROCEDURE — C8929 TTE W OR WO FOL WCON,DOPPLER: HCPCS

## 2022-06-27 PROCEDURE — 97530 THERAPEUTIC ACTIVITIES: CPT

## 2022-06-27 PROCEDURE — 97162 PT EVAL MOD COMPLEX 30 MIN: CPT | Performed by: PHYSICAL THERAPIST

## 2022-06-27 PROCEDURE — 94760 N-INVAS EAR/PLS OXIMETRY 1: CPT

## 2022-06-27 PROCEDURE — 70551 MRI BRAIN STEM W/O DYE: CPT

## 2022-06-27 PROCEDURE — 6360000002 HC RX W HCPCS: Performed by: STUDENT IN AN ORGANIZED HEALTH CARE EDUCATION/TRAINING PROGRAM

## 2022-06-27 PROCEDURE — 6370000000 HC RX 637 (ALT 250 FOR IP): Performed by: STUDENT IN AN ORGANIZED HEALTH CARE EDUCATION/TRAINING PROGRAM

## 2022-06-27 PROCEDURE — 85027 COMPLETE CBC AUTOMATED: CPT

## 2022-06-27 PROCEDURE — 2580000003 HC RX 258: Performed by: STUDENT IN AN ORGANIZED HEALTH CARE EDUCATION/TRAINING PROGRAM

## 2022-06-27 PROCEDURE — 80048 BASIC METABOLIC PNL TOTAL CA: CPT

## 2022-06-27 PROCEDURE — 6370000000 HC RX 637 (ALT 250 FOR IP): Performed by: INTERNAL MEDICINE

## 2022-06-27 PROCEDURE — 93010 ELECTROCARDIOGRAM REPORT: CPT | Performed by: INTERNAL MEDICINE

## 2022-06-27 PROCEDURE — 80061 LIPID PANEL: CPT

## 2022-06-27 PROCEDURE — 92610 EVALUATE SWALLOWING FUNCTION: CPT

## 2022-06-27 PROCEDURE — 83036 HEMOGLOBIN GLYCOSYLATED A1C: CPT

## 2022-06-27 RX ORDER — ALBUTEROL SULFATE 0.63 MG/3ML
1 SOLUTION RESPIRATORY (INHALATION) 4 TIMES DAILY
Status: ON HOLD | COMMUNITY
Start: 2022-06-25 | End: 2022-06-30 | Stop reason: HOSPADM

## 2022-06-27 RX ORDER — OLOPATADINE HYDROCHLORIDE 1 MG/ML
1 SOLUTION/ DROPS OPHTHALMIC 2 TIMES DAILY PRN
COMMUNITY

## 2022-06-27 RX ORDER — GUAIFENESIN 100 MG/5ML
200 SOLUTION ORAL EVERY 6 HOURS PRN
COMMUNITY

## 2022-06-27 RX ORDER — LAMOTRIGINE 25 MG/1
100 TABLET ORAL NIGHTLY
COMMUNITY

## 2022-06-27 RX ORDER — IBUPROFEN 200 MG
200 TABLET ORAL EVERY 6 HOURS PRN
COMMUNITY

## 2022-06-27 RX ORDER — PSEUDOEPHEDRINE HYDROCHLORIDE 30 MG/1
30 TABLET ORAL EVERY 12 HOURS PRN
COMMUNITY

## 2022-06-27 RX ORDER — LAMOTRIGINE 100 MG/1
100 TABLET ORAL NIGHTLY
Status: DISCONTINUED | OUTPATIENT
Start: 2022-06-27 | End: 2022-06-30 | Stop reason: HOSPADM

## 2022-06-27 RX ORDER — LEVOFLOXACIN 750 MG/1
750 TABLET ORAL
Status: ON HOLD | COMMUNITY
Start: 2022-06-25 | End: 2022-06-30 | Stop reason: HOSPADM

## 2022-06-27 RX ORDER — LORATADINE 10 MG/1
10 TABLET ORAL DAILY
Status: ON HOLD | COMMUNITY
End: 2022-06-30 | Stop reason: HOSPADM

## 2022-06-27 RX ADMIN — OXCARBAZEPINE 300 MG: 300 TABLET, FILM COATED ORAL at 00:59

## 2022-06-27 RX ADMIN — ATORVASTATIN CALCIUM 80 MG: 80 TABLET, FILM COATED ORAL at 21:01

## 2022-06-27 RX ADMIN — LAMOTRIGINE 100 MG: 100 TABLET ORAL at 21:01

## 2022-06-27 RX ADMIN — CEFEPIME HYDROCHLORIDE 2000 MG: 2 INJECTION, POWDER, FOR SOLUTION INTRAVENOUS at 13:07

## 2022-06-27 RX ADMIN — LABETALOL HYDROCHLORIDE 100 MG: 100 TABLET, FILM COATED ORAL at 08:50

## 2022-06-27 RX ADMIN — ASPIRIN 81 MG: 81 TABLET, COATED ORAL at 13:08

## 2022-06-27 RX ADMIN — AMLODIPINE BESYLATE 5 MG: 5 TABLET ORAL at 08:52

## 2022-06-27 RX ADMIN — ENOXAPARIN SODIUM 40 MG: 100 INJECTION SUBCUTANEOUS at 08:57

## 2022-06-27 RX ADMIN — OXCARBAZEPINE 300 MG: 300 TABLET, FILM COATED ORAL at 08:49

## 2022-06-27 RX ADMIN — CEFEPIME HYDROCHLORIDE 2000 MG: 2 INJECTION, POWDER, FOR SOLUTION INTRAVENOUS at 23:56

## 2022-06-27 RX ADMIN — ATORVASTATIN CALCIUM 80 MG: 80 TABLET, FILM COATED ORAL at 00:59

## 2022-06-27 RX ADMIN — LABETALOL HYDROCHLORIDE 100 MG: 100 TABLET, FILM COATED ORAL at 21:00

## 2022-06-27 RX ADMIN — OXCARBAZEPINE 300 MG: 300 TABLET, FILM COATED ORAL at 21:01

## 2022-06-27 RX ADMIN — CEFEPIME HYDROCHLORIDE 2000 MG: 2 INJECTION, POWDER, FOR SOLUTION INTRAVENOUS at 00:58

## 2022-06-27 ASSESSMENT — PAIN SCALES - WONG BAKER
WONGBAKER_NUMERICALRESPONSE: 0

## 2022-06-27 NOTE — H&P
[address] [facility] [facility]                              HISTORY AND PHYSICAL    PATIENT NAME: Gael Nicole                :        1925  MED REC NO:   [id number]                          ROOM:       5274  ACCOUNT NO:   [de-identified]                           ADMIT DATE: 2022  PROVIDER:     Ligia Little MD    CHIEF COMPLAINT:  Acute stroke. HISTORY OF PRESENT ILLNESS:  The patient is a 66-year-old white female  admitted through emergency. She presented from [facility] by squad. The  story was she had right-sided weakness and inability to speak. The  squad notes that she went verbal the day of admission with right-sided  weakness. The daughter was unsure if it started that day or the  previous day. She had a fall about a week ago. She also had some  confusion earlier in the week, was diagnosed with a UTI and was on  antibiotics. The last couple of days, family members and visitors  commented she seemed confused but was not acutely until she was noted to  have right-sided weakness and expressive aphasia that she was sent over  to ER. Evaluation in the ER included right-sided paresis and expressive  aphasia. The head CT did not show any acute stroke or bleed. PAST MEDICAL HISTORY:  Remarkable for hematuria and bipolar disorder for  which she takes Lamictal.    PAST SURGICAL HISTORY:  Includes colonoscopy, hysterectomy, and  tonsillectomy. MEDICATIONS ON ADMISSION:  Included alprazolam, amlodipine, Tylenol  Arthritis, labetalol, Lamictal, Zyprexa, Trileptal.    ALLERGIES:  PENICILLIN. FAMILY HISTORY:  Unremarkable. SOCIAL HISTORY:  She is not a smoker or a drinker. She is followed by  the Psychiatric service at [facility]. REVIEW OF SYSTEMS:  Positive for expressive aphasia. Positive for  right-sided weakness. Negative for chest pain. Negative for shortness  of breath.   Ten-point review of systems otherwise performed was  negative. PHYSICAL EXAMINATION:  VITAL SIGNS:  On admission, her blood pressure was 143/54, pulse 87,  respiration 22, O2 sat was 90% to 95% on room air. GENERAL:  Visited by Dr. Sarahi Byrne the following morning, she was lying  in bed, in no acute distress. She was unable to verbalize any  responses. She did have a dense right side hemiparesis. HEENT:  Head is normocephalic, atraumatic. Pupils are equal, round,  reactive to light and accommodation. Extraocular muscles intact. NECK:  Supple without JVD. CHEST:  Clear to auscultation and percussion. CARDIOVASCULAR:  Regular rate and rhythm. ABDOMEN:  Soft, nontender without mass or organomegaly. EXTREMITIES:  Trace of edema. NEUROLOGIC:  She had an expressive aphasia, was unable to speak. She  had dense right-sided paresis. LABORATORY DATA:  Included BUN and creatinine of 12 and 0.6, sodium 132,  potassium 4.5. BNP 1100. Troponin less than 0.01. White count 6600,  hemoglobin 12.8, platelet count 681,354. Urinalysis was unremarkable. Blood gases showed a pH of 7.45 and a pCO2 of 41. CT of the head without contrast done at emergency showed no acute  abnormalities. Portable chest x-ray suggested borderline cardiomegaly and mild  pulmonary vascular congestion. Interstitial markings appeared chronic. IMPRESSION:  1. 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 Negative  head CT. 2.  Hypertension. 3.  Expressive aphasia. 4.  Hyperlipidemia. 5.  Bipolar disorder. PLAN:  It appears that she is admitted to the Medical floor. She will  be monitored for 24 hours. Neurology will consult. We will do an MRI  of the brain, echocardiogram today. Dr. Sarahi Byrne did discuss with the  family in the morning of 06/27. They are aware of her diagnoses and  prognosis and request DNR/CC status which will be respected.   Also,  Lamictal level will be checked at family's request.        Moriah Arenas. Vanessa Homans, MD    D: 06/27/2022 7:40:51       T: 06/27/2022 12:30:22     [id number]  Job#: [id number]     Doc#: [id number]    CC:  Francisco Huber MD

## 2022-06-27 NOTE — PROGRESS NOTES
Speech Language Pathology    10:00 AM:   Evaluation attempted. Patient unavailable with PT/OT at this time. ST to re-attempt as schedule permits unless otherwise notified. 10:20 AM:  Evaluation re-attempted. Patient now leaving for MRI. ST to continue to re-attempt as schedule permits unless otherwise notified. Thank you. Heather Whitlock, [address] [facility], [id number]  Speech-Language Pathologist  Portable phone: [phone]

## 2022-06-27 NOTE — ED NOTES
ED SBAR report provider to [place]. Patient to be transported to Room 5274 via stretcher by RN. Patient transported with bedside cardiac monitor and with IV medications infusing. IV site clean, dry, and intact. MEWS score and pain assessed as 0/10 and documented. Patient's score on the PALOMINO Fall scale reviewed with receiving RN. Updated patient and family on plan of care.        Denis Dimas RN  06/26/22 4147

## 2022-06-27 NOTE — PROGRESS NOTES
Speech Language/Pathology   SPEECH LANGUAGE AND CLINICAL BEDSIDE SWALLOWING EVALUATION   Speech Therapy Department       Cherry Jiménez  AGE: 80 y.o. GENDER: female  : 1925  [id number]  EPISODE DATE:  2022    MEDICAL DIAGNOSIS: CVA  ONSET: 2022     Chief Complaint   Patient presents with    Extremity Weakness     Pt brought in by [employer] from [facility] for right sided weakness that started yesterday. States pt has pneumonia and was previously treated. Went nonverbal yesterday morning and started with right sided weakness. PAST MEDICAL HISTORY      Diagnosis Date    Hematuria, microscopic      PAST SURGICAL HISTORY  Past Surgical History:   Procedure Laterality Date    COLONOSCOPY      Dr. Candace Sinha said it is not necessary to have anymore    HYSTERECTOMY (CERVIX STATUS UNKNOWN)      TONSILLECTOMY AND ADENOIDECTOMY       ALLERGIES  Allergies   Allergen Reactions    Penicillins        CHART REVIEW:  2022 admitted with c/o CVA  2022 Neuro consult noted    IMAGING:  CXR: 2022  Impression   Borderline cardiomegaly and minimal central pulmonary congestion or pulmonary   artery hypertension.       Mild increased interstitial markings throughout which appears chronic and   mild discoid atelectasis or scarring along the lung bases.  No obvious   infiltrate effusion is seen. BRAIN MRI: 2022  Impression   1. Acute infarct within the medial left frontal lobe in the left anterior   cerebral artery territory. 2. No acute intracranial hemorrhage. 3. Mild diffuse cerebral volume loss and chronic small vessel ischemic   changes. 4. Moderate spinal canal stenosis at C1-C2 secondary to degenerative   overgrowth surrounding the dens.      Vision  Vision:  (macular degeneration; requires extra large print)  Hearing  Hearing:  (adequate for assessment needs)    Prior Status: resides at [facility]; recently transferred from assisted living to progressive care; received assistance for ADLs; dependent for IADLs, finance management, and med management; not an active ; completed high school; homemaker; enjoys [email or web address] and [employer]    Reason for referral: Jairo Bowden was referred for a Speech-Language and Bedside Swallow Evaluation to assess the efficiency of communicative effectiveness; the efficiency of swallow function, rule out aspiration and make recommendations regarding safe dietary consistencies, effective compensatory strategies, and safe eating environment. Dysphagia Treatment Diagnosis: Oropharyngeal Dysphagia   Speech Language Treatment Diagnosis: Aphasia; Cognitive-Language      IMPRESSIONS  Dysphagia Diagnosis:   Concern for moderate oral stage dysphagia and potentially severe pharyngeal stage dysphagia characterized by decreased mastication, decreased lingula manipulation, delayed swallow, and decreased laryngeal elevation. Prolonged but adequate bolus formation and movement with all PO; concern for potentially excess effort/labor with regular solids - recommend consideration for easy to chew vs soft/bite size solids as tolerated. Increasing cough (reported as chronic per family) with PO trials with concern for increased penetration/aspiration risk. Patient discussed with MD Jacquelyn Jensen); MD requests defer MBS at this time d/t preference for quality of life/comfort care. ST to follow clinically (per MD request) with continued recs as appropriate. SLP Diagnosis: Patient nonverbal and not following dx or responding to questions even with max/total cues. Appears to present with global aphasia with suspected underlying cognitive-language impairments that were reported as present at baseline with need for cues for sequencing basic tasks.     Recommended Diet:  Diet Solids Recommendation:  (pending ongoing clinical monitor - defer diet to MD at this time)  Liquid Consistency Recommendation:  (pending ongoing clinical monitor - defer diet to MD at this time)    Strategies:   Compensatory Swallowing Strategies : Upright as possible for all oral intake,Remain upright for 30-45 minutes after meals,Eat/Feed slowly,Small bites/sips,Swallow 2 times per bite/sip      PLAN    Requires SLP Intervention: Yes  Therapeutic Interventions: Diet tolerance monitoring,Patient/Family education,Therapeutic PO trials with SLP, Aphasia rehab  Duration of Treatment: ST to tx 3-5 times per week during acute admission    Dysphagia Goals  Dysphagia Goals:  (The patient will tolerate ongoing clinical monitor/re-assessment for most optimal PO diet as tolerated.)  Speech and Language Goals  Short-term Goals  Goal 1: Patient will vocalize 1/5 trials with max cues  Goal 2: Patient will demonstrate basic comprehnsion for picture/object/yes-no id with max cues    Barriers to Progress: prior level of function and severity of current imps    Prognosis  Dysphagia Prognosis: Guarded d/t reported baseline cough with and without PO  SLP Prognosis: Fair d/t severity of current imps    Education  Consulted and agree with results and recommendations: Patient,RN,Family member,Physician  Patient Education Response: No evidence of learning    Discharge Recommendations:    Pt will benefit from continued skilled Speech Therapy for Speech and Dysphagia services      OBJECTIVE  ASSESSMENT: Included Clinical Evaluation of Swallowing; Oral Motor Speech Evaluation and Cognitive-Linguistic Assessment  Auditory Comprehension  One Step Commands: Severe  Reading Comprehension  Reading Status: Unable to assess  Verbal Expression  Initiation: Severe  Repetition: Severe  Automatic Speech: Severe  Cognitive-Language  Overall Orientation Status:  (MIKE)  Attention: Unable to assess  Memory: Unable to assess  Problem Solving: Unable to assess  Numeric Reasoning: Unable to assess  Abstract Reasoning: Unable to assess  Safety/Judgment: Unable to assess  Dysphagia:  PO Trials: seen with lunch: mashed potatoes; minced beef; banana, thin liquids; honey thick liquids  Oral Phase: prolonged mastication; lingual mashing and pumping with solids; prolonged oral transit; suspect premature bolus loss to the pharynx  Pharyngeal Phase: delayed swallow; decreased laryngeal elevation; cough noted with all PO with concern for increasing cough as trials progress regardless of texture/consistency; daughter reports baseline    Please accept this as Speech Therapy Discharge status, if pt is discharged prior to next therapy session. Timed Code Treatment: 0  Total Treatment Time: 45   12:25 PM - 1:10 PM    SIGNED:   Fartun Comer.  [facility], [address] [place] [address], [id number]  Speech-Language Pathologist  Portable phone: [phone]  06/27/22 1:10 PM

## 2022-06-27 NOTE — CONSULTS
Neurology Consult Note  Reason for Consult: stroke    Chief complaint: unable to obtain from the patient at this time    Dr Jim Mendiola MD asked me to see Ivy Frye in consultation for evaluation of stroke    History of Present Illness:  Ivy Frye is a 80 y.o. female who presents with altered mental status. I obtained my information via interview w/ the patient's daughter, supplemented by chart review. The patient is a resident at [facility]. She usually is alert and ambulated w/ a walker. On 6/17 the patient had a fall in the bathroom. Family noticed some cough and congestion. She apparently tested negative for COVID. Over time she seemed to be more sluggish and sleepy. A UA had been obtained and she was eventually started on antibiotics. Her daughter says that this past Thurs/Fri the patient seemed to be having trouble recognizing family members. She really was not communicating and R sided weakness became evident Fri/Sat. She ended up coming to the ED yesterday afternoon in order to be evaluated. /54. CT head appears to show scattered areas of L hemispheric hypoattenuation. NIH 9. Currently she is awake though non verbal and unable to follow commands. Her daughter says that she did eat this morning.       Medical History:  Past Medical History:   Diagnosis Date    Hematuria, microscopic      Past Surgical History:   Procedure Laterality Date    COLONOSCOPY  2006    Dr. Brittani Mcdowell said it is not necessary to have anymore    HYSTERECTOMY (CERVIX STATUS UNKNOWN)      TONSILLECTOMY AND ADENOIDECTOMY       Scheduled Meds:   enoxaparin  40 mg SubCUTAneous Daily    aspirin  81 mg Oral Daily    Or    aspirin  300 mg Rectal Daily    atorvastatin  80 mg Oral Nightly    amLODIPine  5 mg Oral Daily    labetalol  100 mg Oral 2 times per day    lamoTRIgine  25 mg Oral Daily    OXcarbazepine  300 mg Oral BID    cefepime  2,000 mg IntraVENous Q12H Medications Prior to Admission:   Lactobacillus (ACIDOPHILUS/PECTIN) 100 MG CAPS, Take 1 capsule by mouth 2 times daily  albuterol (ACCUNEB) 0.63 MG/3ML nebulizer solution, Take 1 ampule by nebulization 4 times daily  loratadine (CLARITIN) 10 MG tablet, Take 10 mg by mouth daily  ibuprofen (ADVIL;MOTRIN) 200 MG tablet, Take 200 mg by mouth every 6 hours as needed for Pain  lamoTRIgine (LAMICTAL) 25 MG tablet, Take 100 mg by mouth nightly   levoFLOXacin (LEVAQUIN) 750 MG tablet, Take 750 mg by mouth Daily with lunch  dextran 70-hypromellose (TEARS NATURALE) 0.1-0.3 % SOLN opthalmic solution, Place 1 drop into both eyes 3 times daily  olopatadine (PATANOL) 0.1 % ophthalmic solution, Place 1 drop into both eyes 2 times daily as needed for Allergies  pseudoephedrine (SUDAFED) 30 MG tablet, Take 30 mg by mouth every 12 hours as needed for Congestion  guaiFENesin (ROBITUSSIN) 100 MG/5ML SOLN oral solution, Take 200 mg by mouth every 6 hours as needed for Cough  ALPRAZolam (XANAX) 0.5 MG tablet, *WHOLE & HALF TABS* TAKE 1/2 TABLET (0.25MG) BY MOUTH EVERY MORNING;TAKE 1 TABLET BY MOUTH TWICE DAILY (AFTERNOON/EVENING)  amLODIPine (NORVASC) 2.5 MG tablet, Take 2.5 mg by mouth daily   acetaminophen (TYLENOL) 325 MG tablet, Take 650 mg by mouth every 4 hours as needed for Pain or Fever Not to exceed 3 grams in a 24 hour period  OLANZapine (ZYPREXA) 5 MG tablet,   nystatin (MYCOSTATIN) 548926 UNIT/GM powder, Apply topically 2 times daily Apply to under breast and abdominal folds topically two times a day  lamoTRIgine (LAMICTAL) 25 MG tablet, Take 50 mg by mouth every morning   labetalol (NORMODYNE) 100 MG tablet, TAKE ONE TABLET BY MOUTH TWICE A DAY *HOLD FOR    HEART RATE <48 OR        SYSTOLIC UNDER 084* (Patient taking differently: Take 200 mg by mouth 2 times daily Hold for SBP less than 120 mmHg or HR 60 bpm)  HM ALLERGY RELIEF/NASAL DECONG  MG per extended release tablet, TAKE ONE TABLET BY MOUTH EVERY DAY AS NEEDED FOR  ALLERGY SIGNS AND        SYMPTOMS.  magnesium oxide (MAG-OX) 400 (240 MG) MG tablet, Take 1 tablet by mouth daily  OXcarbazepine (TRILEPTAL) 300 MG tablet, TAKE ONE TABLET BY MOUTH TWICE A DAY (Patient taking differently: Take 300 mg by mouth 2 times daily )  HM ARTHRITIS PAIN RELIEF 650 MG CR tablet, TAKE ONE TABLET BY MOUTH EVERY FOUR HOURS AS      NEEDED FOR PAIN  Psyllium (METAMUCIL PO), Take  by mouth. 1 tsp daily     Allergies   Allergen Reactions    Penicillins      Family History   Problem Relation Age of Onset    Depression Mother      Social History     Tobacco Use   Smoking Status Never Smoker   Smokeless Tobacco Never Used     Social History     Substance and Sexual Activity   Drug Use No     Social History     Substance and Sexual Activity   Alcohol Use None     ROS - afebrile. Chart reviewed. Exam  Blood pressure (!) 151/59, pulse 94, temperature 99.2 °F (37.3 °C), temperature source Oral, resp. rate 21, height 5' 1\" (1.549 m), weight 142 lb 3.2 oz (64.5 kg), SpO2 90 %. Constitutional    Vital signs: BP, HR, and RR reviewed   General alert, no distress  Eyes: unable to visualize the fundi  Cardiovascular: no peripheral edema. Psychiatric: no psychotic behavior noted. Neurologic  Mental status:   orientation candy due to aphasia. General fund of knowledge candy due to aphasia. Memory candy due to aphasia. Attention poor   Language expressive/receptive aphasia. Comprehension she is not following any commands. Cranial nerves:   CN2: appears to blink to threat bilaterally. CN 3,4,6: crosses midline. Tracks. Pupils are equal, round, reactive bilaterally. CN5: facial sensation candy due to aphasia. CN7: face does appear grossly symmetric. CN8: hearing grossly intact  CN9: palate elevation candy. CN11: trap strength candy. CN12: tongue protrusion candy. Strength: decreased motor function of RUE/RLE is evident. Antigravity w/ passive motion in LUE.     Sensory: she did have some slight grimace and withdrawal to noxious pain stimulus. Cerebellar/coordination: finger nose finger candy. Tone: decreased RUE/RLE. Gait: deferred at this time for safety given weakness. Labs  Glucose 108  Na 137  K 4.6  BUN 13  Cr 0.5    BNP 1196        ALT 7  AST 18    WBC 8.4K  Hg 12.7  Platelets 451    UA negative    Studies  MRI brain w/o 6/27/22, independently reviewed  1. Acute infarct within the medial left frontal lobe in the left anterior   cerebral artery territory. 2. No acute intracranial hemorrhage. 3. Mild diffuse cerebral volume loss and chronic small vessel ischemic   changes. 4. Moderate spinal canal stenosis at C1-C2 secondary to degenerative   overgrowth surrounding the dens. Impression  1. Language impairment (akinetic mutism?) and R sided motor deficit. There is evidence of an acute L KELSEA infarct. Perhaps concurrent UTI could be making symptoms a bit worse. 2.  Hyperlipidemia. 3.  Recent UTI. Recommendations  1. Agree w/ antiplatelet/statin therapy. 2.  BP at least < 160/90 at this point. 3.  She will likely need extensive rehab. 4.  Discussed w/ daughter at the bedside. It seems like family may be more focused on comfort measures. This can be discussed w/ her PCP. If aggressive care and further investigation into stroke is desired we can help guide that process.       Jannette Allen NP  [address] [place] [address] [id number] Neurology    A copy of this note was provided for Dr Rony Khan MD

## 2022-06-27 NOTE — FLOWSHEET NOTE
Provided SOS and support     [facility]     06/27/22 1552   Encounter Summary   Encounter Overview/Reason  Initial Encounter;Spiritual/Emotional Needs; Rituals, Rites and Sacraments   Service Provided For: Patient and family together   Referral/Consult From: Other (comment)   Support System Children;Family members   Last Encounter  06/27/22  (SOS Support by CO 6/27)   Complexity of Encounter Moderate   Begin Time 1450   End Time  1524   Total Time Calculated 34 min   Spiritual/Emotional needs   Type Spiritual Support   Rituals, Rites and Sacraments   Type Sacrament of Sick; Anointing   Assessment/Intervention/Outcome   Assessment Calm   Intervention Active listening   Outcome Comfort;Coping;Expressed Gratitude

## 2022-06-27 NOTE — PROGRESS NOTES
Late entry for 2307 Patient arrived to room from ED Placed in bed Heart monitor placed on Oriented to room and call light

## 2022-06-27 NOTE — PROGRESS NOTES
Physical Therapy  Facility/Department: [facility]N PROGRESSIVE CARE  Physical Therapy Initial Assessment    Name: Maximino Webb  : 1925  MRN: [id number]  Date of Service: 2022    Discharge Recommendations:  Continue to assess pending progress,Subacute/Skilled Nursing Olivia Patiño would benefit from continued therapy after discharge   PT Equipment Recommendations  Equipment Needed: No    Maximino Webb scored a  on the AM-PAC short mobility form. Current research shows that an AM-PAC score of 17 or less is typically not associated with a discharge to the patient's home setting. Based on the patient's AM-PAC score and their current functional mobility deficits, it is recommended that the patient have 3-5 sessions per week of Physical Therapy at d/c to increase the patient's independence. Please see assessment section for further patient specific details. If patient discharges prior to next session this note will serve as a discharge summary. Please see below for the latest assessment towards goals. Patient Diagnosis(es): The encounter diagnosis was Cerebrovascular accident (CVA), unspecified mechanism ([address] Past Medical History:  has a past medical history of Hematuria, microscopic. Past Surgical History:  has a past surgical history that includes Tonsillectomy and adenoidectomy; Hysterectomy; and Colonoscopy (). Assessment   Body Structures, Functions, Activity Limitations Requiring Skilled Therapeutic Intervention: Decreased functional mobility ; Decreased ADL status; Decreased strength;Decreased safe awareness;Decreased cognition;Decreased endurance;Decreased balance;Decreased vision/visual deficit; Decreased fine motor control;Decreased coordination;Decreased posture  Assessment: Patient is a 79 y/o female with Dx of ischemic stroke admitted to acute hospital from [facility] on . Patient had right-sided weakness and inability to speak. Family report increase confusion few days prior and had fall in bathroom about 1 week ago, thought due to UTI. CT scan negative, patient to have MRI today,6/27. PLOF daughter, Treva Velazco , reports able to transfer and ambulate without assist using wheeled walker with occasional cue for safety with limited vision R eye due to macular degeneration. Patient recently moved to long term care primarily due to cues and limited with distance in enviromental mobility. At this time, patient presents with limited movement/strength R UE /LE, increased tone R UE,  aphasia with limited initiation with command following and patient dependent for bed mobility and sitting at edge of bed with max assist of 2 persons. Patient would benefit from additional skilled physical therapy to improve overall functional mobility to decrease burden of care. Treatment Diagnosis: decreased functional mobility  Specific Instructions for Next Treatment: attempt stedy with 2 if able , sitting balannce  Therapy Prognosis: Fair;Guarded  Decision Making: Medium Complexity  History: see below  Exam: see below  Clinical Presentation: evolving  Barriers to Learning: cognition, aphasia  Requires PT Follow-Up: Yes  Activity Tolerance  Activity Tolerance: Treatment limited secondary to decreased cognition     Plan   Plan  Plan: 3-5 times per week  Specific Instructions for Next Treatment: attempt stedy with 2 if able , sitting balannce  Current Treatment Recommendations: Strengthening,Balance training,Endurance training,Functional mobility training,Transfer training,ADL/Self-care training,Wheelchair mobility training,Neuromuscular re-education,Patient/Caregiver education & training,Equipment evaluation, education, & procurement,Positioning,Therapeutic activities,ROM  Safety Devices  Type of Devices:  All myla prominences offloaded,All fall risk precautions in place,Call light within reach,Heels elevated for pressure relief,Patient at risk for falls,Left in bed,Bed alarm in place stimuli  Following Commands: Does not follow commands  Attention Span: Difficulty attending to directions  Safety Judgement: Decreased awareness of need for safety  Problem Solving: Decreased awareness of errors  Insights: Not aware of deficits  Initiation: Requires cues for all  Cognition Comment: patient awake but not able to initiate follow motor commands, aphasic     Objective      Observation/Palpation  Posture: Poor (sitting posterior and leans towards  side)        PROM RLE (degrees)  RLE PROM: WFL  AROM RLE (degrees)  RLE General AROM: no active movement noted at this time  PROM LLE (degrees)  LLE PROM: WFL  AROM LLE (degrees)  LLE AROM : Exceptions  LLE General AROM: Patient able to perform LAQ but slow with tactile and verbal cues after demonstration, noted toe movement but unable to follow commands  Strength RLE  Strength RLE: Exception  Comment: limited ability to assess, no assist with passicve movement, does not move with tactile and verbal cues  Strength LLE  Strength LLE: Exception  Comment: limited assessment due to aphasia, able to extend L knee after demonstration with tactile cues        Bed Mobility Training  Bed Mobility Training: Yes  Overall Level of Assistance: Assist X2;Total assistance  Interventions: Demonstration;Verbal cues; Tactile cues; Visual cues  Rolling: Maximum assistance;Assist X2 (unable to assist, apraxia?, no assist with R arm)  Supine to Sit: Assist X2;Total assistance  Sit to Supine: Assist X2;Total assistance  Scooting: Assist X2;Total assistance  Balance  Sitting: Impaired  Sitting - Static: Poor (constant support) (pushes towardsweak side R with L UE)  Sitting - Dynamic: Poor (constant support)  Standing: Impaired (not safe to stand)  Transfer Training  Transfer Training:  (not safe need maxi move at this time)  Gait Training  Gait Training: No (not safe)  Bed mobility  Rolling to Left: Dependent/Total  Rolling to Right: Dependent/Total  Supine to Sit: Dependent/Total  Sit to Supine: Dependent/Total  Scooting: Dependent/Total  Bed Mobility Comments: dependent with all movement, limited initiation, R side weakness, apraxia, limited command following  Transfers  Sit to Stand: Unable to assess (not safe at this time)        Balance  Posture: Poor  Sitting - Static: Poor  Sitting - Dynamic: Poor  Comments: max assist of 2 with lean toward R side and posterior                  AM-PAC Score  AM-PAC Inpatient Mobility Raw Score : 6 (06/27/22 1103)  AM-PAC Inpatient T-Scale Score : 23.55 (06/27/22 1103)  Mobility Inpatient CMS 0-100% Score: 100 (06/27/22 1103)  Mobility Inpatient CMS G-Code Modifier : CN (06/27/22 1103)          Goals  Short Term Goals  Time Frame for Short term goals: by d/c  Short term goal 1: transfer with steady with mod/max assist of 2  Short term goal 2: sit edge of bed with moderate assist x 10 min  Short term goal 3: bed mobility with moderate assist  Short term goal 4: assess w/cmobility as able  Long Term Goals  Time Frame for Long term goals : STG=LTG  Patient Goals   Patient goals : unable to state       Education  Patient Education  Education Given To: Patient; Family (Gabby Richards)  Education Provided: Role of Therapy;Plan of Care;Family Education;Orientation  Education Provided Comments: discussed with daughter positioning with R UE, B heels elevated from bed  Education Method: Verbal  Barriers to Learning: Cognition;Vision  Education Outcome: Unable to verbalize      Therapy Time   Individual Concurrent Group Co-treatment   Time In 0930         Time Out 1015         Minutes 45         Timed Code Treatment Minutes: 621 \[facility] [place] [facility]\"", PT # [id number]

## 2022-06-27 NOTE — ED PROVIDER NOTES
[facility]  eMERGENCY dEPARTMENT eNCOUnter      Pt Name: Suze Ernandez  MRN: [id number]  [place] 7/13/1925  Date of evaluation: 6/26/2022  Provider: Marsha Fraser MD    [address] [place] [address]       Chief Complaint   Patient presents with    Extremity Weakness     Pt brought in by [address] from [facility] for right sided weakness that started yesterday. States pt has pneumonia and was previously treated. Went nonverbal yesterday morning and started with right sided weakness. CRITICAL CARE TIME   Total Critical Care time was 0 minutes, excluding separately reportable procedures. There was a high probability of clinically significant/life threatening deterioration in the patient's condition which required my urgent intervention. HISTORY OF PRESENT ILLNESS  (Location/Symptom, Timing/Onset, Context/Setting, Quality, Duration, Modifying Factors, Severity.)   Suze Ernandez is a 80 y.o. female who presents to the emergency department for right-sided weakness and inability to speak. EMS reported to the nursing staff that it started yesterday. EMS reported \"she went nonverbal yesterday morning with right-sided weakness\". Her daughter is here. She is not sure if it started yesterday morning, possibly the day before. Her daughter tells me she had a fall about a week and a half ago. She states on Thursday she was having some confusion. She was diagnosed with a UTI. She was started on antibiotics. He states in the last couple days family members at visitor and commented that she was confused and did not recognize the family members. She does not remember exactly what day that was whether it was Friday or Saturday. She states that her mother's been very healthy up until just recently. She is a DNR CC. Nursing Notes were reviewed and I agree.     REVIEW OF SYSTEMS    (2-9 systems for level 4, 10 or more for level 5) Unable to obtain, patient is aphasic. Except as noted above the remainder of the review of systems was reviewed and negative. PAST MEDICAL HISTORY     Past Medical History:   Diagnosis Date    Hematuria, microscopic          SURGICAL HISTORY       Past Surgical History:   Procedure Laterality Date    COLONOSCOPY  2006    Dr. Henna Celestin said it is not necessary to have anymore    HYSTERECTOMY (CERVIX STATUS UNKNOWN)      TONSILLECTOMY AND ADENOIDECTOMY           CURRENT MEDICATIONS       Previous Medications    ACETAMINOPHEN (TYLENOL) 325 MG TABLET        ALPRAZOLAM (XANAX) 0.5 MG TABLET    *WHOLE & HALF TABS* TAKE 1/2 TABLET (0.25MG) BY MOUTH EVERY MORNING;TAKE 1 TABLET BY MOUTH TWICE DAILY (AFTERNOON/EVENING)    AMLODIPINE (NORVASC) 5 MG TABLET        HM ALLERGY RELIEF/NASAL DECONG  MG PER EXTENDED RELEASE TABLET    TAKE ONE TABLET BY MOUTH EVERY DAY AS NEEDED FOR  ALLERGY SIGNS AND        SYMPTOMS. HM ARTHRITIS PAIN RELIEF 650 MG CR TABLET    TAKE ONE TABLET BY MOUTH EVERY FOUR HOURS AS      NEEDED FOR PAIN    LABETALOL (NORMODYNE) 100 MG TABLET    TAKE ONE TABLET BY MOUTH TWICE A DAY *HOLD FOR    HEART RATE <41 OR        SYSTOLIC UNDER 225*    LAMOTRIGINE (LAMICTAL) 25 MG TABLET    Take 25 mg by mouth daily    MAGNESIUM OXIDE (MAG-OX) 400 (240 MG) MG TABLET    Take 1 tablet by mouth daily    NYSTATIN (MYCOSTATIN) 604560 UNIT/GM POWDER    Apply topically 2 times daily Apply topically 4 times daily. OLANZAPINE (ZYPREXA) 5 MG TABLET        OXCARBAZEPINE (TRILEPTAL) 300 MG TABLET    TAKE ONE TABLET BY MOUTH TWICE A DAY    PSYLLIUM (METAMUCIL PO)    Take  by mouth. 1 tsp daily        ALLERGIES     Penicillins    FAMILY HISTORY       Family History   Problem Relation Age of Onset    Depression Mother           SOCIAL HISTORY       Social History     Socioeconomic History    Marital status:       Spouse name: None    Number of children: None    Years of education: None    Highest education level: None   Occupational History    None   Tobacco Use    Smoking status: Never Smoker    Smokeless tobacco: Never Used   Substance and Sexual Activity    Alcohol use: None    Drug use: No    Sexual activity: None   Other Topics Concern    None   Social History Narrative    None     Social Determinants of Health     Financial Resource Strain: Unknown    Difficulty of Paying Living Expenses: Patient refused   Food Insecurity: No Food Insecurity    Worried About Running Out of Food in the Last Year: Never true    [address] of Food in the Last Year: Never true   Transportation Needs:     Lack of Transportation (Medical): Not on file    Lack of Transportation (Non-Medical): Not on file   Physical Activity:     Days of Exercise per Week: Not on file    Minutes of Exercise per Session: Not on file   Stress:     Feeling of Stress : Not on file   Social Connections:     Frequency of Communication with Friends and Family: Not on file    Frequency of Social Gatherings with Friends and Family: Not on file    Attends Voodoo Services: Not on file    Active Member of [address] [place] [address] or Organizations: Not on file    Attends Club or Organization Meetings: Not on file    Marital Status: Not on file   Intimate Partner Violence:     Fear of Current or Ex-Partner: Not on file    Emotionally Abused: Not on file    Physically Abused: Not on file    Sexually Abused: Not on file   Housing Stability:     Unable to Pay for Housing in the Last Year: Not on file    Number of [place] in the Last Year: Not on file    Unstable Housing in the Last Year: Not on file         PHYSICAL EXAM    (up to 7 for level 4, 8 or more for level 5)     ED Triage Vitals [06/26/22 1723]   BP Temp Temp Source Heart Rate Resp SpO2 Height Weight   (!) 143/54 98.5 °F (36.9 °C) Oral 87 22 90 % -- 149 lb 14.6 oz (68 kg)       General: An awake and alert elderly female in no obvious distress.   Head: Atraumatic and normocephalic. Eyes: Pupils equal round reactive. Intact extraocular movements. ENT: No facial trauma. TMs normal.  Nose clear. Oropharynx moist.  There is no obvious facial asymmetry. Neck: Supple, nontender, no adenopathy. Heart: Regular rate and rhythm. No murmurs or gallops noted. Lungs: Breath sounds equal bilaterally and clear. Abdomen: Obese, soft, nontender. No masses organomegaly. Bowel sounds are normal.  Musculoskeletal: No lower extremity edema. Intact symmetrical distal pulses. Skin: Warm and dry, good turgor. No pallor or cyanosis. No diaphoresis. Neuro: Awake, alert, attentive. She has symmetrical reactive pupils. She has intact extraocular movements. She has no facial asymmetry right arm and right leg showed no significant strength against gravity and fall to the bed. She is able to  weakly and hold her left arm up. She is able to move her left leg and plantarflex her foot. Is aphasic. Unable to do visual field testing. Unable to access sensation. NIHSS of 9. DIFFERENTIAL DIAGNOSIS   Differential includes but is not limited to intracerebral hemorrhage, subdural, thrombotic stroke, embolic stroke, hypoglycemia, urinary tract infection, other. DIAGNOSTIC RESULTS     EKG: All EKG's are interpreted by Sloane Moeller MD in the absence of a cardiologist.    Normal sinus rhythm, rate of 87, poor R wave progression. Rhythm strip shows sinus rhythm with a rate of 87, CO interval 160 ms, QRS 78 ms with no other ectopy as interpreted by me. This compared to an EKG dated 6/16/2019, no significant changes noted.     RADIOLOGY:   Non-plain film images such as CT, Ultrasound and MRI are read by the radiologist. Plain radiographic images are visualized and preliminarily interpreted Sloane Moeller MD with the below findings:      Interpretation per the Radiologist below, if available at the time of this note:    [address]   Final Result   No acute intracranial abnormality. XR CHEST PORTABLE   Final Result   Borderline cardiomegaly and minimal central pulmonary congestion or pulmonary   artery hypertension. Mild increased interstitial markings throughout which appears chronic and   mild discoid atelectasis or scarring along the lung bases. No obvious   infiltrate effusion is seen. ED BEDSIDE ULTRASOUND:   Performed by ED Physician - none    LABS:  Labs Reviewed   COMPREHENSIVE METABOLIC PANEL - Abnormal; Notable for the following components:       Result Value    Sodium 132 (*)     Chloride 97 (*)     Glucose 106 (*)     Total Protein 6.1 (*)     ALT 7 (*)     All other components within normal limits   BLOOD GAS, VENOUS - Abnormal; Notable for the following components:    pH, Claus 7.454 (*)     All other components within normal limits   BRAIN NATRIURETIC PEPTIDE - Abnormal; Notable for the following components:    Pro-BNP 1,196 (*)     All other components within normal limits   CBC WITH AUTO DIFFERENTIAL   TROPONIN   URINALYSIS WITH REFLEX TO CULTURE   LAMOTRIGINE LEVEL       All other labs were within normal range or not returned as of this dictation. EMERGENCY DEPARTMENT COURSE and DIFFERENTIAL DIAGNOSIS/MDM:   Vitals:    Vitals:    06/26/22 2000 06/26/22 2045 06/26/22 2100 06/26/22 2106   BP: (!) 149/53 (!) 150/59 (!) 83/55    Pulse: 81 87 90 88   Resp: 25 29 14 28   Temp:       TempSrc:       SpO2: 94% 93% 92% 96%   Weight: This patient presents with symptoms as above. Her last known well is not well-established, but it appears to be significantly longer than 24 hours ago, possibly as much is 48. He presents with significant neurologic deficits, she is mute, she has significant right-sided weakness. Her heart rate is normal.  Her oxygen saturations are good. She is awake and alert. Her blood pressure is stable. Her CT head shows no acute intracranial findings. Her EKG shows a normal sinus rhythm.   She is not hypoglycemic. Her urinalysis is unremarkable. Her clinical presentation is consistent with a CVA. She was going to require admission for further evaluation and treatment. She is a DNR CC. I confirmed this with her daughters. I discussed the case with her primary care provider, Dr. Nasreen Heaton was covering. He placed admitting orders. Test results, diagnosis, and treatment plan were discussed with the patient's daughters. They understand the treatment plan and need for admission and are agreeable      CONSULTS:  IP CONSULT TO HOSPITALIST    PROCEDURES:  None    FINAL IMPRESSION      1. Cerebrovascular accident (CVA), unspecified mechanism ([facility] [place] [address]          DISPOSITION/PLAN   DISPOSITION Decision To Admit 06/26/2022 09:48:01 PM      PATIENT REFERRED TO:  No follow-up provider specified.     DISCHARGE MEDICATIONS:  New Prescriptions    No medications on file       (Please note that portions of this note were completed with a voice recognition program.  Efforts were made to edit the dictations but occasionally words are mis-transcribed.)    Jessica Veras MD  Attending Emergency Physician        Darryle Punter, MD  06/26/22 5561

## 2022-06-27 NOTE — ED NOTES
Pt's family came out of room stating the pt had \"stopped breathing\". This nurse entered the room and the pt was sitting up, alert and red in the face from attempting to cough. The pt's oxygen saturation was 92% with good waveform. This nurse instructed the pt to try and cough and an attempt was made by the pt. The pt's oxygen saturation cuca to 96%. Provider notified.             Misty Scheuermann, RN  06/26/22 2364

## 2022-06-27 NOTE — CARE COORDINATION
INITIAL CASE MANAGEMENT ASSESSMENT  Per chart review, patient is a resident at [place] [address] at [facility]. Called to [place] in admissions at [facility]. Left voicemail requesting return phone call regarding patient's options at discharge and notified that PT/OT evaluations are in. Living Situation: Assisted Living resident     PT/OT Recs: skilled nursing facility at discharge     NEED: to verify plan with patient and family. SW/CM will follow and assist as needed. VIRGEN Culp, [facility] [employer] Work  [phone]  Electronically signed by VIRGEN Culp, MIKAEL on 6/27/2022 at 3:16 PM    Spoke to [place] at [facility]. Confirmed patient is a resident at [place] [address], confirmed patient can come back under Skilled (if precert obtained, [place] to submit for precert today), or long term care with therapy under Medicare Part D. Patient will need a COVID test within a week of discharge for return to [facility].    Electronically signed by VIRGEN Culp LSW on 6/27/2022 at 4:10 PM

## 2022-06-27 NOTE — PROGRESS NOTES
Occupational Therapy  Facility/Department: [address] PROGRESSIVE CARE  Occupational Therapy Initial Assessment    Name: Ivy Frye  : 1925  MRN: [id number]  Date of Service: 2022    Discharge Recommendations:  Patient would benefit from continued therapy after discharge,3-5 sessions per week  OT Equipment Recommendations  Other: defer to [facility] [address] scored a  on the AM-PAC ADL Inpatient form. Current research shows that an AM-PAC score of 17 or less is typically not associated with a discharge to the patient's home setting. Based on the patient's AM-PAC score and their current ADL deficits, it is recommended that the patient have 3-5 sessions per week of Occupational Therapy at d/c to increase the patient's independence. Please see assessment section for further patient specific details. If patient discharges prior to next session this note will serve as a discharge summary. Please see below for the latest assessment towards goals. Patient Diagnosis(es): The encounter diagnosis was Cerebrovascular accident (CVA), unspecified mechanism ([facility] [address] Past Medical History:  has a past medical history of Hematuria, microscopic. Past Surgical History:  has a past surgical history that includes Tonsillectomy and adenoidectomy; Hysterectomy; and Colonoscopy (). Assessment   Performance deficits / Impairments: Decreased functional mobility ; Decreased safe awareness;Decreased ADL status; Decreased balance;Decreased cognition;Decreased high-level IADLs;Decreased endurance;Decreased ROM; Decreased strength;Decreased fine motor control;Decreased posture;Decreased coordination  Assessment: 81 y/o female admitted 2022 with right sided weakness and aphasia. CT head negative for acute findings. MRI pending. PTA, daughter reports pt lives in long term care at [facility]. Pt ambulates with RW and independent with dressing/toileting/feeding at baseline.  Today, pt does not follow commands. Pt dependent for all mobility and briefly sat EOB with dependent assist to maintain balance. no active ROM observed. Pt will be dependent assist for ADLs at this time. Pt is functioning below baseline and benefit from skilled therapy. Prognosis: Fair  Decision Making: Medium Complexity  REQUIRES OT FOLLOW-UP: Yes  Activity Tolerance  Activity Tolerance: Treatment limited secondary to decreased cognition        Plan   Plan  Times per Week: 3-5  Current Treatment Recommendations: Strengthening,ROM,Balance training,Functional mobility training,Endurance training,Cognitive reorientation,Neuromuscular re-education,Gait training,Safety education & training,Equipment evaluation, education, & procurement,Self-Care / ADL,Positioning     Restrictions  Restrictions/Precautions  Restrictions/Precautions: Fall Risk,General Precautions  Position Activity Restriction  Other position/activity restrictions: global aphasia    Subjective   General  Chart Reviewed: Yes  Patient assessed for rehabilitation services?: Yes  Additional Pertinent Hx: 79 y/o female admitted 6/26/2022 with right sided weakness and aphasia. CT head negative for acute findings. MRI pending. Pt with recent diagnoses of UTI and PNA. Family / Caregiver Present: Yes (daughter)  Referring Practitioner: Christiane Duran MD  Diagnosis: CVA  Subjective  Subjective: Pt seen bedside with daughter present. Daughter provided information. Pt occasionally will visually attend to you when speaking; otherwise does not follow commands.      Social/Functional History  Social/Functional History  Type of Home: Facility ([facility])  Home Equipment: nedra Rivas  Has the patient had two or more falls in the past year or any fall with injury in the past year?:  (had fall 1 week ago in bathroom; otherwise no falls)  ADL Assistance: Needs assistance (staff assist with showers; otherwise pt can dress, toilet, and feed self)  Ambulation Assistance: Independent (with RW in facility- occas cues for staff for safety)  Transfer Assistance: Independent  Active : No  Type of Occupation: recently moved from [place] [address] to [facility] a few weeks ago due to need for safety cues and additional assist  Additional Comments: Ricardo Choe at bedside to provide social information. Objective     Vision Exceptions: Wears glasses for reading (unable to read)  Hearing: Within functional limits       Observation/Palpation  Posture: Poor (sitting posterior and leans towards  side)     Safety Devices  Type of Devices: All myla prominences offloaded; All fall risk precautions in place;Call light within reach; Heels elevated for pressure relief;Patient at risk for falls; Left in bed;Bed alarm in place       Balance  Sitting: Impaired (tolerated sitting EOB ~ 7-8 imin)  Sitting - Static: Poor (constant support) (pushes towards weak side R with L UE)  Sitting - Dynamic: Poor (constant support)       AROM: Grossly decreased, non-functional (unable to formally assess 2/2 aphasic and command following)  Strength: Grossly decreased, non-functional  Coordination: Grossly decreased, non-functional  Tone: Abnormal (inc tone in R UE)     ADL  Additional Comments: Anticipate pt will require max A for all ADLs based on balance and endurance observed     Activity Tolerance  Activity Tolerance: Treatment limited secondary to decreased cognition  Bed mobility  Rolling to Left: Dependent/Total  Rolling to Right: Dependent/Total  Supine to Sit: Dependent/Total  Sit to Supine: Dependent/Total  Scooting: Dependent/Total  Bed Mobility Comments: dependent with all movement, limited initiation, R side weakness, apraxia, limited command following     Transfers  Transfer Comments: unsafe to attempt standing 2/2 poor sitting balance and dec command following  Vision - Basic Assessment  Visual History: Macular degeneration  Vision Comments: intermittently visually attends to you when speaking but not on command     Cognition  Overall Cognitive Status: Exceptions  Arousal/Alertness: Delayed responses to stimuli  Following Commands: Does not follow commands  Attention Span: Difficulty attending to directions  Safety Judgement: Decreased awareness of need for safety  Problem Solving: Decreased awareness of errors  Insights: Not aware of deficits  Initiation: Requires cues for all  Cognition Comment: patient awake but not able to initiate follow motor commands, aphasic                  Education Given To: Patient; Family  Education Provided: Role of Therapy;Plan of Care;Transfer Training  Barriers to Learning: Cognition  Education Outcome: Continued education needed     LUE AROM (degrees)  LUE General AROM: does not follow commands to assess full range  Left Hand AROM (degrees)  Left Hand General AROM: minimally picks up L hand off bed 1x with significant cuing. does not follow commands to assess full range  RUE AROM (degrees)  RUE General AROM: no AROM, unable to assess 2/2 command following; appears to have inc tone  Right Hand AROM (degrees)  Right Hand General AROM: no AROM, unable to assess 2/2 command following; appears to have inc tone    AM-PAC Score  AM-[facility] Inpatient Daily Activity Raw Score: 6 (06/27/22 1106)  AM-PAC Inpatient ADL T-Scale Score : 17.07 (06/27/22 1106)  ADL Inpatient CMS 0-100% Score: 100 (06/27/22 1106)  ADL Inpatient CMS G-Code Modifier : CN (06/27/22 1106)    Goals  Short Term Goals  Time Frame for Short term goals: Prior to DC:   Short Term Goal 1: Pt will tolerate sitting EOB > 5 min with mod A in prep for transfers  Short Term Goal 2: Pt will complete rolling R/L for supine ADLs with mod A  Short Term Goal 3: Pt will complete UE exercises in all planes to inc strength/endurance for ADLs  Short Term Goal 4: Pt will complete bed mobility with mod A in prep for ADL transfer  Short Term Goal 5: Pt will complete ADL transfer to [facility] with mod A x 2  Patient Goals   Patient goals : no goal stated 2/2 cognition. Daughter: hopeful to return to PLOF       Therapy Time   Individual Concurrent Group Co-treatment   Time In 0930         Time Out 1015         Minutes 45         Timed Code Treatment Minutes: 30 Minutes     This note to serve as OT d/c summary if pt is d/c-ed prior to next therapy session.     Rexene Medicine, OTR/L

## 2022-06-28 ENCOUNTER — APPOINTMENT (OUTPATIENT)
Dept: GENERAL RADIOLOGY | Age: 87
DRG: 065 | End: 2022-06-28
Payer: COMMERCIAL

## 2022-06-28 LAB
A/G RATIO: 1.2 (ref 1.1–2.2)
ALBUMIN SERPL-MCNC: 3.3 G/DL (ref 3.4–5)
ALP BLD-CCNC: 59 U/L (ref 40–129)
ALT SERPL-CCNC: 8 U/L (ref 10–40)
ANION GAP SERPL CALCULATED.3IONS-SCNC: 13 MMOL/L (ref 3–16)
AST SERPL-CCNC: 20 U/L (ref 15–37)
BASOPHILS ABSOLUTE: 0.1 K/UL (ref 0–0.2)
BASOPHILS RELATIVE PERCENT: 1.2 %
BILIRUB SERPL-MCNC: 0.4 MG/DL (ref 0–1)
BUN BLDV-MCNC: 16 MG/DL (ref 7–20)
CALCIUM SERPL-MCNC: 8.4 MG/DL (ref 8.3–10.6)
CHLORIDE BLD-SCNC: 98 MMOL/L (ref 99–110)
CO2: 21 MMOL/L (ref 21–32)
CREAT SERPL-MCNC: <0.5 MG/DL (ref 0.6–1.2)
EOSINOPHILS ABSOLUTE: 0.2 K/UL (ref 0–0.6)
EOSINOPHILS RELATIVE PERCENT: 2.1 %
GFR AFRICAN AMERICAN: >60
GFR NON-AFRICAN AMERICAN: >60
GLUCOSE BLD-MCNC: 117 MG/DL (ref 70–99)
HCT VFR BLD CALC: 36.2 % (ref 36–48)
HEMOGLOBIN: 12.5 G/DL (ref 12–16)
LAMOTRIGINE LEVEL: 1.9 UG/ML (ref 3–15)
LAMOTRIGINE LEVEL: 2.3 UG/ML (ref 3–15)
LYMPHOCYTES ABSOLUTE: 1.5 K/UL (ref 1–5.1)
LYMPHOCYTES RELATIVE PERCENT: 18.1 %
MCH RBC QN AUTO: 32.6 PG (ref 26–34)
MCHC RBC AUTO-ENTMCNC: 34.6 G/DL (ref 31–36)
MCV RBC AUTO: 94.2 FL (ref 80–100)
MONOCYTES ABSOLUTE: 0.8 K/UL (ref 0–1.3)
MONOCYTES RELATIVE PERCENT: 9.8 %
NEUTROPHILS ABSOLUTE: 5.5 K/UL (ref 1.7–7.7)
NEUTROPHILS RELATIVE PERCENT: 68.8 %
PDW BLD-RTO: 12.9 % (ref 12.4–15.4)
PLATELET # BLD: 284 K/UL (ref 135–450)
PMV BLD AUTO: 6.7 FL (ref 5–10.5)
POTASSIUM SERPL-SCNC: 4.3 MMOL/L (ref 3.5–5.1)
RBC # BLD: 3.85 M/UL (ref 4–5.2)
SODIUM BLD-SCNC: 132 MMOL/L (ref 136–145)
TOTAL PROTEIN: 6.1 G/DL (ref 6.4–8.2)
WBC # BLD: 8 K/UL (ref 4–11)

## 2022-06-28 PROCEDURE — 99233 SBSQ HOSP IP/OBS HIGH 50: CPT | Performed by: INTERNAL MEDICINE

## 2022-06-28 PROCEDURE — 97530 THERAPEUTIC ACTIVITIES: CPT

## 2022-06-28 PROCEDURE — 94760 N-INVAS EAR/PLS OXIMETRY 1: CPT

## 2022-06-28 PROCEDURE — 71045 X-RAY EXAM CHEST 1 VIEW: CPT

## 2022-06-28 PROCEDURE — 2580000003 HC RX 258: Performed by: STUDENT IN AN ORGANIZED HEALTH CARE EDUCATION/TRAINING PROGRAM

## 2022-06-28 PROCEDURE — 97112 NEUROMUSCULAR REEDUCATION: CPT

## 2022-06-28 PROCEDURE — 80053 COMPREHEN METABOLIC PANEL: CPT

## 2022-06-28 PROCEDURE — 6370000000 HC RX 637 (ALT 250 FOR IP): Performed by: STUDENT IN AN ORGANIZED HEALTH CARE EDUCATION/TRAINING PROGRAM

## 2022-06-28 PROCEDURE — 36415 COLL VENOUS BLD VENIPUNCTURE: CPT

## 2022-06-28 PROCEDURE — 6370000000 HC RX 637 (ALT 250 FOR IP): Performed by: INTERNAL MEDICINE

## 2022-06-28 PROCEDURE — 1200000000 HC SEMI PRIVATE

## 2022-06-28 PROCEDURE — 6360000002 HC RX W HCPCS: Performed by: STUDENT IN AN ORGANIZED HEALTH CARE EDUCATION/TRAINING PROGRAM

## 2022-06-28 PROCEDURE — 85025 COMPLETE CBC W/AUTO DIFF WBC: CPT

## 2022-06-28 RX ORDER — ALPRAZOLAM 0.25 MG/1
0.25 TABLET ORAL 2 TIMES DAILY PRN
Status: DISCONTINUED | OUTPATIENT
Start: 2022-06-28 | End: 2022-06-30 | Stop reason: HOSPADM

## 2022-06-28 RX ADMIN — LABETALOL HYDROCHLORIDE 100 MG: 100 TABLET, FILM COATED ORAL at 20:50

## 2022-06-28 RX ADMIN — LAMOTRIGINE 50 MG: 25 TABLET ORAL at 09:43

## 2022-06-28 RX ADMIN — OXCARBAZEPINE 300 MG: 300 TABLET, FILM COATED ORAL at 20:50

## 2022-06-28 RX ADMIN — ALPRAZOLAM 0.25 MG: 0.25 TABLET ORAL at 20:51

## 2022-06-28 RX ADMIN — LABETALOL HYDROCHLORIDE 100 MG: 100 TABLET, FILM COATED ORAL at 09:42

## 2022-06-28 RX ADMIN — ASPIRIN 81 MG: 81 TABLET, COATED ORAL at 09:43

## 2022-06-28 RX ADMIN — CEFEPIME HYDROCHLORIDE 2000 MG: 2 INJECTION, POWDER, FOR SOLUTION INTRAVENOUS at 12:39

## 2022-06-28 RX ADMIN — ATORVASTATIN CALCIUM 80 MG: 80 TABLET, FILM COATED ORAL at 20:50

## 2022-06-28 RX ADMIN — ENOXAPARIN SODIUM 40 MG: 100 INJECTION SUBCUTANEOUS at 09:44

## 2022-06-28 RX ADMIN — LAMOTRIGINE 100 MG: 100 TABLET ORAL at 20:50

## 2022-06-28 RX ADMIN — AMLODIPINE BESYLATE 5 MG: 5 TABLET ORAL at 09:43

## 2022-06-28 RX ADMIN — ALPRAZOLAM 0.25 MG: 0.25 TABLET ORAL at 13:56

## 2022-06-28 RX ADMIN — OXCARBAZEPINE 300 MG: 300 TABLET, FILM COATED ORAL at 09:42

## 2022-06-28 ASSESSMENT — PAIN SCALES - WONG BAKER
WONGBAKER_NUMERICALRESPONSE: 0

## 2022-06-28 NOTE — CARE COORDINATION
Discharge Planning:   PT/OT: Recommending SNF     Met with patient and daughter Lester Muse at bedside. Discussed discharge plan. Confirmed plan is to return to [facility] at appropriate level of care. Confirmed family seeking skilled therapy is insurance approval obtained. Backup plan would be for patient to return with therapy under part D insurance. SW/CM provided contact information for patient or family to call with any questions. SW/CM will follow and assist as needed.     VIRGEN Geller, [place], Social Work  [phone]  Electronically signed by VIRGEN Geller, LSW on 6/28/2022 at 10:26 AM

## 2022-06-28 NOTE — PROGRESS NOTES
Neurology Progress Note    Updates  About the same clinically. No significant events.       Medical History:  Past Medical History:   Diagnosis Date    Hematuria, microscopic      Past Surgical History:   Procedure Laterality Date    COLONOSCOPY  2006    Dr. Chetan Costello said it is not necessary to have anymore    HYSTERECTOMY (CERVIX STATUS UNKNOWN)      TONSILLECTOMY AND ADENOIDECTOMY       Current Facility-Administered Medications:   ALPRAZolam (XANAX) tablet 0.25 mg, 0.25 mg, Oral, BID PRN  lamoTRIgine (LAMICTAL) tablet 100 mg, 100 mg, Oral, Nightly  ondansetron (ZOFRAN-ODT) disintegrating tablet 4 mg, 4 mg, Oral, Q8H PRN **OR** ondansetron (ZOFRAN) injection 4 mg, 4 mg, IntraVENous, Q6H PRN  polyethylene glycol (GLYCOLAX) packet 17 g, 17 g, Oral, Daily PRN  enoxaparin (LOVENOX) injection 40 mg, 40 mg, SubCUTAneous, Daily  aspirin EC tablet 81 mg, 81 mg, Oral, Daily **OR** aspirin suppository 300 mg, 300 mg, Rectal, Daily  atorvastatin (LIPITOR) tablet 80 mg, 80 mg, Oral, Nightly  labetalol (NORMODYNE;TRANDATE) injection 10 mg, 10 mg, IntraVENous, Q10 Min PRN  amLODIPine (NORVASC) tablet 5 mg, 5 mg, Oral, Daily  labetalol (NORMODYNE) tablet 100 mg, 100 mg, Oral, 2 times per day  lamoTRIgine (LAMICTAL) tablet 50 mg, 50 mg, Oral, Daily  OXcarbazepine (TRILEPTAL) tablet 300 mg, 300 mg, Oral, BID  cefepime (MAXIPIME) 2000 mg IVPB minibag, 2,000 mg, IntraVENous, Q12H    Medications Prior to Admission:   Lactobacillus (ACIDOPHILUS/PECTIN) 100 MG CAPS, Take 1 capsule by mouth 2 times daily  albuterol (ACCUNEB) 0.63 MG/3ML nebulizer solution, Take 1 ampule by nebulization 4 times daily  loratadine (CLARITIN) 10 MG tablet, Take 10 mg by mouth daily  ibuprofen (ADVIL;MOTRIN) 200 MG tablet, Take 200 mg by mouth every 6 hours as needed for Pain  lamoTRIgine (LAMICTAL) 25 MG tablet, Take 100 mg by mouth nightly   levoFLOXacin (LEVAQUIN) 750 MG tablet, Take 750 mg by mouth Daily with lunch  dextran 70-hypromellose (TEARS NATURALE) 0.1-0.3 % SOLN opthalmic solution, Place 1 drop into both eyes 3 times daily  olopatadine (PATANOL) 0.1 % ophthalmic solution, Place 1 drop into both eyes 2 times daily as needed for Allergies  pseudoephedrine (SUDAFED) 30 MG tablet, Take 30 mg by mouth every 12 hours as needed for Congestion  guaiFENesin (ROBITUSSIN) 100 MG/5ML SOLN oral solution, Take 200 mg by mouth every 6 hours as needed for Cough  ALPRAZolam (XANAX) 0.5 MG tablet, *WHOLE & HALF TABS* TAKE 1/2 TABLET (0.25MG) BY MOUTH EVERY MORNING;TAKE 1 TABLET BY MOUTH TWICE DAILY (AFTERNOON/EVENING)  amLODIPine (NORVASC) 2.5 MG tablet, Take 2.5 mg by mouth daily   acetaminophen (TYLENOL) 325 MG tablet, Take 650 mg by mouth every 4 hours as needed for Pain or Fever Not to exceed 3 grams in a 24 hour period  OLANZapine (ZYPREXA) 5 MG tablet,   nystatin (MYCOSTATIN) 808081 UNIT/GM powder, Apply topically 2 times daily Apply to under breast and abdominal folds topically two times a day  lamoTRIgine (LAMICTAL) 25 MG tablet, Take 50 mg by mouth every morning   labetalol (NORMODYNE) 100 MG tablet, TAKE ONE TABLET BY MOUTH TWICE A DAY *HOLD FOR    HEART RATE <57 OR        SYSTOLIC UNDER 275* (Patient taking differently: Take 200 mg by mouth 2 times daily Hold for SBP less than 120 mmHg or HR 60 bpm)  HM ALLERGY RELIEF/NASAL DECONG  MG per extended release tablet, TAKE ONE TABLET BY MOUTH EVERY DAY AS NEEDED FOR  ALLERGY SIGNS AND        SYMPTOMS.  magnesium oxide (MAG-OX) 400 (240 MG) MG tablet, Take 1 tablet by mouth daily  OXcarbazepine (TRILEPTAL) 300 MG tablet, TAKE ONE TABLET BY MOUTH TWICE A DAY (Patient taking differently: Take 300 mg by mouth 2 times daily )  HM ARTHRITIS PAIN RELIEF 650 MG CR tablet, TAKE ONE TABLET BY MOUTH EVERY FOUR HOURS AS      NEEDED FOR PAIN  Psyllium (METAMUCIL PO), Take  by mouth. 1 tsp daily     Allergies   Allergen Reactions    Penicillins      ROS - afebrile. Chart reviewed.      Exam  Blood pressure (!) 154/70, pulse 78, temperature 97.6 °F (36.4 °C), temperature source Axillary, resp. rate 23, height 5' 1\" (1.549 m), weight 143 lb 8.3 oz (65.1 kg), SpO2 96 %. Constitutional    Vital signs: BP, HR, and RR reviewed   General alert, no distress  Eyes: unable to visualize the fundi  Cardiovascular: no peripheral edema. Psychiatric: no psychotic behavior noted. Neurologic  Mental status:   orientation candy due to mutism. Attention poor   Language mute. Comprehension she is not following any commands. Cranial nerves:   CN2: appears to blink to threat bilaterally. CN 3,4,6: crosses midline. Tracks. Pupils are equal, round, reactive bilaterally. CN7: face does appear grossly symmetric. CN8: hearing grossly intact  CN12: tongue protrusion candy. Strength: decreased motor function of RUE/RLE is evident. Cerebellar/coordination: finger nose finger candy. Tone: decreased RUE/RLE. Gait: deferred at this time for safety given weakness. Labs  HgA1c 5.4      Studies  MRI brain w/o 6/27/22  1. Acute infarct within the medial left frontal lobe in the left anterior   cerebral artery territory. 2. No acute intracranial hemorrhage. 3. Mild diffuse cerebral volume loss and chronic small vessel ischemic   changes. 4. Moderate spinal canal stenosis at C1-C2 secondary to degenerative   overgrowth surrounding the dens. TTE 6/27/22   Ejection fraction is visually estimated to be 55-60%. No regional wall motion abnormalities are noted. Grade II diastolic dysfunction with elevated LV filling pressures. Mitral annular calcification is present. Mild mitral regurgitation. The left atrium is moderately dilated. A bubble study was performed and fails to show evidence of shunting. LA is dilated   Normal right ventricular size and function. 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 She is about the same clinically.   Would be suspicious for an embolic event. Recommendations  1. Continue antiplatelet/statin therapy. 2.  BP at least < 160/90 trending toward normotension. 3.  Rehabilitation efforts. 4.  Monitor on telemetry. We will sign off. Please call back w/ any additional questions or concerns. Thank you.      Mckenzie Minor NP  [address] [place] [address] [phone] Neurology    A copy of this note was provided for Dr Abida Murphy MD

## 2022-06-28 NOTE — PROGRESS NOTES
[facility] Progress Note  6/28/2022 8:08 AM  Subjective:   Admit Date: 6/26/2022      Chief Complaint: Non verbal due to stroke     Interval History: MRI shows Lt frontal stroke  Exp aphasia persists  Is tolerating diet --speech has seen her     Diet: ADULT DIET; Regular  Medications:   Scheduled Meds:   lamoTRIgine  100 mg Oral Nightly    enoxaparin  40 mg SubCUTAneous Daily    aspirin  81 mg Oral Daily    Or    aspirin  300 mg Rectal Daily    atorvastatin  80 mg Oral Nightly    amLODIPine  5 mg Oral Daily    labetalol  100 mg Oral 2 times per day    lamoTRIgine  50 mg Oral Daily    OXcarbazepine  300 mg Oral BID    cefepime  2,000 mg IntraVENous Q12H     Continuous Infusions:    Review of Systems -   General ROS: afebrile  Respiratory ROS: no cough, shortness of breath or wheezing  Cardiovascular ROS: no chest pain  Musculoskeletal ROS:positive for - :joint pain  Neurological ROS: positive for - speech problems    Objective:   Vitals: BP (!) 173/79   Pulse 99   Temp 98.2 °F (36.8 °C) (Axillary)   Resp 28   Ht 5' 1\" (1.549 m)   Wt 143 lb 8.3 oz (65.1 kg)   SpO2 91%   BMI 27.12 kg/m²   General appearance: alert and cooperative with exam  HEENT: Head: Normocephalic, no lesions, without obvious abnormality.   Neck: no adenopathy, no carotid bruit, no JVD, supple, symmetrical, trachea midline and thyroid not enlarged, symmetric, no tenderness/mass/nodules  Lungs: diminished breath sounds bibasilar  Heart: regular rate and rhythm, S1, S2 normal, no murmur, click, rub or gallop  Abdomen: soft, non-tender; bowel sounds normal; no masses,  no organomegaly  Extremities: extremities normal, atraumatic, no cyanosis or edema  Neurologic: Mental status: rt body weak--exp aphasia persists     Admission on 06/26/2022   Component Date Value Ref Range Status    WBC 06/26/2022 6.6  4.0 - 11.0 K/uL Final    RBC 06/26/2022 4.02  4.00 - 5.20 M/uL Final    Hemoglobin 06/26/2022 12.8  12.0 - 16.0 g/dL Final    Hematocrit 06/26/2022 38.1  36.0 - 48.0 % Final    MCV 06/26/2022 94.9  80.0 - 100.0 fL Final    MCH 06/26/2022 32.0  26.0 - 34.0 pg Final    MCHC 06/26/2022 33.7  31.0 - 36.0 g/dL Final    RDW 06/26/2022 13.1  12.4 - 15.4 % Final    Platelets 40/24/7515 269  135 - 450 K/uL Final    MPV 06/26/2022 6.9  5.0 - 10.5 fL Final    Neutrophils % 06/26/2022 67.0  % Final    Lymphocytes % 06/26/2022 16.7  % Final    Monocytes % 06/26/2022 14.0  % Final    Eosinophils % 06/26/2022 1.6  % Final    Basophils % 06/26/2022 0.7  % Final    Neutrophils Absolute 06/26/2022 4.4  1.7 - 7.7 K/uL Final    Lymphocytes Absolute 06/26/2022 1.1  1.0 - 5.1 K/uL Final    Monocytes Absolute 06/26/2022 0.9  0.0 - 1.3 K/uL Final    Eosinophils Absolute 06/26/2022 0.1  0.0 - 0.6 K/uL Final    Basophils Absolute 06/26/2022 0.0  0.0 - 0.2 K/uL Final    Sodium 06/26/2022 132* 136 - 145 mmol/L Final    Potassium 06/26/2022 4.5  3.5 - 5.1 mmol/L Final    Chloride 06/26/2022 97* 99 - 110 mmol/L Final    CO2 06/26/2022 27  21 - 32 mmol/L Final    Anion Gap 06/26/2022 8  3 - 16 Final    Glucose 06/26/2022 106* 70 - 99 mg/dL Final    BUN 06/26/2022 12  7 - 20 mg/dL Final    CREATININE 06/26/2022 0.6  0.6 - 1.2 mg/dL Final    GFR Non- 06/26/2022 >60  >60 Final    Comment: >60 mL/min/1.73m2 EGFR, calc. for ages 25 and older using the  MDRD formula (not corrected for weight), is valid for stable  renal function.  GFR  06/26/2022 >60  >60 Final    Comment: Chronic Kidney Disease: less than 60 ml/min/1.73 sq.m. Kidney Failure: less than 15 ml/min/1.73 sq.m. Results valid for patients 18 years and older.       Calcium 06/26/2022 8.6  8.3 - 10.6 mg/dL Final    Total Protein 06/26/2022 6.1* 6.4 - 8.2 g/dL Final    Albumin 06/26/2022 3.6  3.4 - 5.0 g/dL Final    Albumin/Globulin Ratio 06/26/2022 1.4  1.1 - 2.2 Final    Total Bilirubin 06/26/2022 0.3  0.0 - 1.0 mg/dL Final    Alkaline Phosphatase 06/26/2022 61  40 - 129 U/L Final    ALT 06/26/2022 7* 10 - 40 U/L Final    AST 06/26/2022 18  15 - 37 U/L Final    Troponin 06/26/2022 0.01  <0.01 ng/mL Final    Methodology by Troponin T    pH, Claus 06/26/2022 7.454* 7.350 - 7.450 Final    pCO2, Claus 06/26/2022 41.8  40.0 - 50.0 mmHg Final    pO2, Claus 06/26/2022 50  Not Established mmHg Final    HCO3, Venous 06/26/2022 29  23 - 29 mmol/L Final    Base Excess, Claus 06/26/2022 4.9  Not Established mmol/L Final    O2 Sat, Claus 06/26/2022 86  Not Established % Final    Carboxyhemoglobin 06/26/2022 1.3  % Final    Comment:      0.0-1.5  (Smokers 1.5-5.0)      MetHgb, Claus 06/26/2022 0.5  <1.5 % Final    TC02 (Calc), Claus 06/26/2022 31  Not Established mmol/L Final    O2 Therapy 06/26/2022 Unknown   Final    Pro-BNP 06/26/2022 1,196* 0 - 449 pg/mL Final    Comment: Methodology by NT-proBNP    An age-independent cutoff point of 300 pg/ml has a 98%  negative predictive value excluding acute heart failure. Values exceeding the age-related cutoff values (450 pg/mL if  age<50, 900 if 50-75 and 1800 if >75) has 90% sensitivity and  84% specificity for diagnosing acute HF. In patients with  renal compromise (eGFR<60) values greater than 1200pg/ml have  a diagnostic sensitivity and specificity of 89% and 72% for  acute HF.       Color, UA 06/26/2022 Yellow  Straw/Yellow Final    Clarity, UA 06/26/2022 Clear  Clear Final    Glucose, Ur 06/26/2022 Negative  Negative mg/dL Final    Bilirubin Urine 06/26/2022 Negative  Negative Final    Ketones, Urine 06/26/2022 Negative  Negative mg/dL Final    Specific [place], UA 06/26/2022 1.013  1.005 - 1.030 Final    Blood, Urine 06/26/2022 Negative  Negative Final    pH, UA 06/26/2022 5.0  5.0 - 8.0 Final    Protein, UA 06/26/2022 Negative  Negative mg/dL Final    Urobilinogen, Urine 06/26/2022 0.2  <2.0 E.U./dL Final    Nitrite, Urine 06/26/2022 Negative  Negative Final    Leukocyte Esterase, Urine 06/26/2022 3.5 - 5.1 mmol/L Final    Chloride 06/27/2022 102  99 - 110 mmol/L Final    CO2 06/27/2022 25  21 - 32 mmol/L Final    Anion Gap 06/27/2022 10  3 - 16 Final    Glucose 06/27/2022 108* 70 - 99 mg/dL Final    BUN 06/27/2022 13  7 - 20 mg/dL Final    CREATININE 06/27/2022 0.5* 0.6 - 1.2 mg/dL Final    GFR Non- 06/27/2022 >60  >60 Final    Comment: >60 mL/min/1.73m2 EGFR, calc. for ages 25 and older using the  MDRD formula (not corrected for weight), is valid for stable  renal function.  GFR  06/27/2022 >60  >60 Final    Comment: Chronic Kidney Disease: less than 60 ml/min/1.73 sq.m. Kidney Failure: less than 15 ml/min/1.73 sq.m. Results valid for patients 18 years and older.       Calcium 06/27/2022 8.7  8.3 - 10.6 mg/dL Final    WBC 06/28/2022 8.0  4.0 - 11.0 K/uL Final    RBC 06/28/2022 3.85* 4.00 - 5.20 M/uL Final    Hemoglobin 06/28/2022 12.5  12.0 - 16.0 g/dL Final    Hematocrit 06/28/2022 36.2  36.0 - 48.0 % Final    MCV 06/28/2022 94.2  80.0 - 100.0 fL Final    MCH 06/28/2022 32.6  26.0 - 34.0 pg Final    MCHC 06/28/2022 34.6  31.0 - 36.0 g/dL Final    RDW 06/28/2022 12.9  12.4 - 15.4 % Final    Platelets 41/36/3417 284  135 - 450 K/uL Final    MPV 06/28/2022 6.7  5.0 - 10.5 fL Final    Neutrophils % 06/28/2022 68.8  % Final    Lymphocytes % 06/28/2022 18.1  % Final    Monocytes % 06/28/2022 9.8  % Final    Eosinophils % 06/28/2022 2.1  % Final    Basophils % 06/28/2022 1.2  % Final    Neutrophils Absolute 06/28/2022 5.5  1.7 - 7.7 K/uL Final    Lymphocytes Absolute 06/28/2022 1.5  1.0 - 5.1 K/uL Final    Monocytes Absolute 06/28/2022 0.8  0.0 - 1.3 K/uL Final    Eosinophils Absolute 06/28/2022 0.2  0.0 - 0.6 K/uL Final    Basophils Absolute 06/28/2022 0.1  0.0 - 0.2 K/uL Final    Sodium 06/28/2022 132* 136 - 145 mmol/L Final    Potassium 06/28/2022 4.3  3.5 - 5.1 mmol/L Final    Chloride 06/28/2022 98* 99 - 110 mmol/L Final    CO2 06/28/2022 21  21 - 32 mmol/L Final    Anion Gap 06/28/2022 13  3 - 16 Final    Glucose 06/28/2022 117* 70 - 99 mg/dL Final    BUN 06/28/2022 16  7 - 20 mg/dL Final    CREATININE 06/28/2022 <0.5* 0.6 - 1.2 mg/dL Final    GFR Non- 06/28/2022 >60  >60 Final    Comment: >60 mL/min/1.73m2 EGFR, calc. for ages 25 and older using the  MDRD formula (not corrected for weight), is valid for stable  renal function.  GFR  06/28/2022 >60  >60 Final    Comment: Chronic Kidney Disease: less than 60 ml/min/1.73 sq.m. Kidney Failure: less than 15 ml/min/1.73 sq.m. Results valid for patients 18 years and older.  Calcium 06/28/2022 8.4  8.3 - 10.6 mg/dL Final    Total Protein 06/28/2022 6.1* 6.4 - 8.2 g/dL Final    Albumin 06/28/2022 3.3* 3.4 - 5.0 g/dL Final    Albumin/Globulin Ratio 06/28/2022 1.2  1.1 - 2.2 Final    Total Bilirubin 06/28/2022 0.4  0.0 - 1.0 mg/dL Final    Alkaline Phosphatase 06/28/2022 59  40 - 129 U/L Final    ALT 06/28/2022 8* 10 - 40 U/L Final    AST 06/28/2022 20  15 - 37 U/L Final         Assessment & Plan:   Principal Problem:    Ischemic stroke (HCC)--lt frontal cva---cont asa--pt/ot   Active Problems:    Hypertension--Continue current therapy      Expressive aphasia--speech rx     Hyperlipidemia--Continue current therapy    Resolved Problems:    * No resolved hospital problems.  *  MARISELA Martinez Slice at bedside ---restart small dose of xanax to help her sleep as avis-  Please note that over 35 minutes was spent in evaluating the patient, review of records and results, discussion with staff/family, etc.    Shabbir Mosquera MD

## 2022-06-28 NOTE — ACP (ADVANCE CARE PLANNING)
Advance Care Planning     Advance Care Planning Activator (Inpatient)  Conversation Note      Date of ACP Conversation: 6/28/2022     Conversation Conducted with:  Healthcare Decision Maker: Next of Kin by law (only applies in absence of above) (name) Sarahi Robbins (daughter)     ACP Activator: VIRGEN Sunshine, [facility] Decision Maker:     Current Designated Health Care Decision Maker:     Primary Decision Maker: [place] Cynthia  Sussy - [phone]    Today we documented Decision Maker(s) consistent with ACP documents on file. Care Preferences    Ventilation: \"If you were in your present state of health and suddenly became very ill and were unable to breathe on your own, what would your preference be about the use of a ventilator (breathing machine) if it were available to you? \"      Would the patient desire the use of ventilator (breathing machine)?: no    \"If your health worsens and it becomes clear that your chance of recovery is unlikely, what would your preference be about the use of a ventilator (breathing machine) if it were available to you? \"     Would the patient desire the use of ventilator (breathing machine)?: No      Resuscitation  \"CPR works best to restart the heart when there is a sudden event, like a heart attack, in someone who is otherwise healthy. Unfortunately, CPR does not typically restart the heart for people who have serious health conditions or who are very sick. \"    \"In the event your heart stopped as a result of an underlying serious health condition, would you want attempts to be made to restart your heart (answer \"yes\" for attempt to resuscitate) or would you prefer a natural death (answer \"no\" for do not attempt to resuscitate)? \" no       [] Yes   [x] No   Educated Patient / Mack Mac regarding differences between Advance Directives and portable DNR orders.     Length of ACP Conversation in minutes: 5    Conversation Outcomes:  [x] ACP discussion completed  [] Existing advance directive reviewed with patient; no changes to patient's previously recorded wishes  [] New Advance Directive completed  [] Portable Do Not Rescitate prepared for Provider review and signature  [] POLST/POST/MOLST/MOST prepared for Provider review and signature      Follow-up plan:    [] Schedule follow-up conversation to continue planning  [] Referred individual to Provider for additional questions/concerns   [] Advised patient/agent/surrogate to review completed ACP document and update if needed with changes in condition, patient preferences or care setting    [x] This note routed to one or more involved healthcare providers       Electronically signed by VIRGEN Carney, MIKAEL on 6/28/2022 at 11:13 AM

## 2022-06-28 NOTE — PROGRESS NOTES
Speech Language Pathology    SLP/Dysphagia Tx Attempt    Pt in bed asleep, with occasional visual tracking to sound. Son and daughter at bedside, reporting pt just received xanax and just fell asleep, with poor sleep overnight. They are in agreement to defer tx this afternoon and retry on 6/29. This SLP spent time discussing dysphagia and SLP POC and goals with family; opportunity provided for them to answer questions.       Link MS Hany, CCC-SLP #[id number]  Speech Language Pathologist  Total time spent: 10 min nonbillable

## 2022-06-28 NOTE — PROGRESS NOTES
cognition;Patient limited by fatigue        Plan   Plan  Times per Week: 3-5  Current Treatment Recommendations: Strengthening,ROM,Balance training,Functional mobility training,Endurance training,Cognitive reorientation,Neuromuscular re-education,Gait training,Safety education & training,Equipment evaluation, education, & procurement,Self-Care / ADL,Positioning     Restrictions  Restrictions/Precautions  Restrictions/Precautions: Fall Risk,General Precautions  Position Activity Restriction  Other position/activity restrictions: global aphasia    Subjective   General  Chart Reviewed: Yes,Orders,Progress Notes,Imaging,Labs  Patient assessed for rehabilitation services?: Yes  Additional Pertinent Hx: 81 y/o female admitted 6/26/2022 with right sided weakness and aphasia. CT head negative for acute findings. MRI pending. Pt with recent diagnoses of UTI and PNA. Family / Caregiver Present: Yes (daughter)  Referring Practitioner: Georgi Aschoff, MD  Diagnosis: CVA  Subjective  Subjective: Pt met BS, in bed. Pt alert, eyes open to sound of name, yet pt not engaging, visually tracking (pt minimally visually tracked to sound of daughter's voice however). Pt did not indicate pain with movement. Daughter stating pt is \"very tired\" from not sleeping well last night.      Social/Functional History  Social/Functional History  Type of Home: Facility ([facility])  Home Equipment: [id number]  Has the patient had two or more falls in the past year or any fall with injury in the past year?:  (had fall 1 week ago in bathroom; otherwise no falls)  ADL Assistance: Needs assistance (staff assist with showers; otherwise pt can dress, toilet, and feed self)  Ambulation Assistance: Independent (with RW in facility- occas cues for staff for safety)  Transfer Assistance: Independent  Active : No  Type of Occupation: recently moved from [place] [address] [facility] a few weeks ago due to need for safety cues and additional assist  Additional Comments: Ricardo Perla at bedside to provide social information. Objective   Wheelchair Bed Transfers  Level of Asssistance: Dependent/Total  Wheelchair Transfers Comments: via Maxi Move lift     ADL  Toileting: Dependent/Total  Toileting Skilled Clinical Factors: incontinent of small BM. Using purewick. Additional Comments: Anticipate pt will require max A for all ADLs based on balance and endurance observed        Bed mobility  Rolling to Left: Dependent/Total;2 Person assistance  Rolling to Right: Dependent/Total;2 Person assistance  Supine to Sit: Dependent/Total;2 Person assistance  Sit to Supine: Dependent/Total;2 Person assistance  Scooting: Dependent/Total;2 Person assistance  Bed Mobility Comments: dependent with all movement, limited initiation, R side weakness, apraxia, limited command following  Transfers  Transfer Comments: unsafe to attempt standing 2/2 poor sitting balance and dec command following     Cognition  Overall Cognitive Status: Exceptions  Following Commands: Does not follow commands  Cognition Comment: patient awake but not able to initiate follow motor commands, aphasic       Static Sitting Balance Exercises: Max/total A for sitting at EOB x8 min. Pt pushing to R side when LUE holding to bed rail. Dynamic Sitting Balance Exercises: Max/total A for wt shifting ex's at EOB: lateral(onto R elbow) and anterior leaning. Education Given To: Patient; Family  Education Provided: Role of Therapy;Plan of Care;Transfer Training  Barriers to Learning: Cognition; Other (Comment) (receptive/expressive aphasia)  Education Outcome: Continued education needed           Safety Devices  Type of Devices: Chair alarm in place; Left in chair;Nurse notified;Call light within reach; All fall risk precautions in place; All myla prominences offloaded (Pt in recliner on Maxi move lift pad, and use of waffle cushion)                         AM-PAC Score        AM-PAC Inpatient Daily Activity Raw Score: 6 (06/28/22 1006)  AM-PAC Inpatient ADL T-Scale Score : 17.07 (06/28/22 1006)  ADL Inpatient CMS 0-100% Score: 100 (06/28/22 1006)  ADL Inpatient CMS G-Code Modifier : CN (06/28/22 1006)    Goals  Short Term Goals  Time Frame for Short term goals: Prior to DC. Status: goals ongoing  Short Term Goal 1: Pt will tolerate sitting EOB > 5 min with mod A in prep for transfers  Short Term Goal 2: Pt will complete rolling R/L for supine ADLs with mod A  Short Term Goal 3: Pt will complete UE exercises in all planes to inc strength/endurance for ADLs  Short Term Goal 4: Pt will complete bed mobility with mod A in prep for ADL transfer  Short Term Goal 5: Pt will complete ADL transfer to stedy with mod A x 2  Patient Goals   Patient goals : no goal stated 2/2 cognition.  Daughter: hopeful to return to PLOF       Therapy Time   Individual Concurrent Group Co-treatment   Time In 0907         Time Out 0945         Minutes [address] [id number]/L,515

## 2022-06-28 NOTE — PLAN OF CARE
Problem: Discharge Planning  Goal: Discharge to home or other facility with appropriate resources  Outcome: Progressing   Alert Non-verbal Resp. Easy and even Sats. WNL on RA Lungs with some expir.  Wheezes in upper lobes that clear with cough No s/s pain Purwik in place with good output of clear yellow urine Christy-care done and Purwik changed Frequently turned and repositioned Free from fall/injury Normal vision: sees adequately in most situations; can see medication labels, newsprint

## 2022-06-28 NOTE — PROGRESS NOTES
Physical Therapy  Facility/Department: [address] PROGRESSIVE CARE  Physical Therapy Treatment session    Name: Oleg Edwards  : 1925  MRN: [id number]  Date of Service: 2022    Discharge Recommendations:  Patient would benefit from continued therapy after discharge (3-5x/wk)   PT Equipment Recommendations  Equipment Needed: No  Other: defer to next level of care      Patient Diagnosis(es): The encounter diagnosis was Cerebrovascular accident (CVA), unspecified mechanism ([address] Past Medical History:  has a past medical history of Hematuria, microscopic. Past Surgical History:  has a past surgical history that includes Tonsillectomy and adenoidectomy; Hysterectomy; and Colonoscopy (). Assessment   Body Structures, Functions, Activity Limitations Requiring Skilled Therapeutic Intervention: Decreased functional mobility ; Decreased ADL status; Decreased strength;Decreased safe awareness;Decreased cognition;Decreased endurance;Decreased balance;Decreased vision/visual deficit; Decreased fine motor control;Decreased coordination;Decreased posture  Assessment: Patient is a 79 y/o female with Dx of ischemic stroke admitted to acute hospital from [facility] on . Patient had right-sided weakness and inability to speak. Family report increase confusion few days prior and had fall in bathroom about 1 week ago, thought due to UTI. MRI positive for CVA. PLOF daughter, Treva Velazco , reports able to transfer and ambulate without assist using wheeled walker with occasional cue for safety with limited vision R eye due to macular degeneration. Patient recently moved to long term care primarily due to cues and limited with distance in enviromental mobility. Today, pt required total assist for bed mobility and max-total assist for sitting balance EOB. Limited by weakness, hypertonicity, and aphasia.   She will benefit from continued skilled inpatient PT at a low-moderate intensity upon d/c to improve safety and independence with functional mobility and to decrease burden of care. Will continue to follow. Treatment Diagnosis: decreased functional mobility  Therapy Prognosis: Fair;Guarded  Requires PT Follow-Up: Yes  Activity Tolerance  Activity Tolerance: Treatment limited secondary to decreased cognition     Plan   Plan  Plan: 3-5 times per week  Specific Instructions for Next Treatment: attempt stedy with 2 if able , sitting balannce  Current Treatment Recommendations: Strengthening,Balance training,Endurance training,Functional mobility training,Transfer training,ADL/Self-care training,Wheelchair mobility training,Neuromuscular re-education,Patient/Caregiver education & training,Equipment evaluation, education, & procurement,Positioning,Therapeutic activities,ROM  Safety Devices  Type of Devices: Chair alarm in place,Left in chair,Nurse notified,Call light within reach,All fall risk precautions in place,All myla prominences offloaded (Pt in recliner on Maxi move lift pad, and use of waffle cushion)     Restrictions  Restrictions/Precautions  Restrictions/Precautions: Fall Risk,General Precautions  Position Activity Restriction  Other position/activity restrictions: global aphasia     Subjective   General  Chart Reviewed: Yes  Patient assessed for rehabilitation services?: Yes  Response To Previous Treatment: Patient unable to report, no changes reported from family or staff  Family / Caregiver Present: Yes (daughter)  Referral Date : 06/26/22  Diagnosis: ischemic CVA  Follows Commands: Impaired  Subjective  Subjective: Pt supine in bed upon arrival.  No verbalization during session. Did not appear to be in any pain.          Social/Functional History  Social/Functional History  Type of Home: Facility ([facility])  Home Equipment: Brittanynedra Cruz  Has the patient had two or more falls in the past year or any fall with injury in the past year?:  (had fall 1 week ago in bathroom; otherwise no falls)  ADL Assistance: Needs assistance (staff assist with showers; otherwise pt can dress, toilet, and feed self)  Ambulation Assistance: Independent (with RW in facility- occas cues for staff for safety)  Transfer Assistance: Independent  Active : No  Type of Occupation: recently moved from [place] [address] [facility] a few weeks ago due to need for safety cues and additional assist  Additional Comments: [place] [id number] [facility] [address] at bedside to provide social information. Objective   Bed mobility  Rolling to Left: Dependent/Total;2 Person assistance  Rolling to Right: Dependent/Total;2 Person assistance  Supine to Sit: Dependent/Total;2 Person assistance  Sit to Supine: Dependent/Total;2 Person assistance  Bed Mobility Comments: rolled R and L for placement of maxi-move sling; noted increase tone RLE knee ext when in supine  Transfers  Bed to Chair: Dependent/Total (via maxi-move)        Balance  Comments: pt required max-total assist for sitting balance EOB for approx 8 min; pt began pushing with LUE when hand placed on bed rail; cues for upright posture and for anterior weight shift.   unable to assess standing balance (unsafe to attempt)  Exercise Treatment: x5 reps anterior lean and x5 reps lateral weight shifting onto R elbow with max-total assist throughout; x2 reps PROM RLE knee ext (no active movement noted) and x3 reps AAROM L knee flex/ext (noted increased tone knee ext this date)      AM-PAC Score  AM-PAC Inpatient Mobility Raw Score : 6 (06/28/22 1447)  AM-PAC Inpatient T-Scale Score : 23.55 (06/28/22 1447)  Mobility Inpatient CMS 0-100% Score: 100 (06/28/22 1447)  Mobility Inpatient CMS G-Code Modifier : CN (06/28/22 1447)          Goals  Short Term Goals  Time Frame for Short term goals: by d/c (goals ongoing as of 6/28)  Short term goal 1: transfer with steady with mod/max assist of 2  Short term goal 2: sit edge of bed with moderate assist x 10 min  Short term goal 3: bed mobility with moderate assist  Short term goal 4: assess w/cmobility as able  Long Term Goals  Time Frame for Long term goals : STG=LTG  Patient Goals   Patient goals : unable to state       Education  Patient Education  Education Given To: Patient; Family  Education Provided: Role of Therapy;Plan of Care;Family Education  Education Provided Comments: discussed with daughter positioning with CONNIE ESPOSITO heels elevated from bed  Education Method: Verbal  Barriers to Learning: Cognition;Vision  Education Outcome: Unable to verbalize      Therapy Time   Individual Concurrent Group Co-treatment   Time In 0907         Time Out 0945         Minutes 38         Timed Code Treatment Minutes: 38 Minutes         Electronically signed by Abel Bro on 6/28/2022 at 2:48 PM

## 2022-06-29 ENCOUNTER — APPOINTMENT (OUTPATIENT)
Dept: GENERAL RADIOLOGY | Age: 87
DRG: 065 | End: 2022-06-29
Payer: COMMERCIAL

## 2022-06-29 LAB
ANION GAP SERPL CALCULATED.3IONS-SCNC: 11 MMOL/L (ref 3–16)
BASOPHILS ABSOLUTE: 0.1 K/UL (ref 0–0.2)
BASOPHILS RELATIVE PERCENT: 0.7 %
BUN BLDV-MCNC: 13 MG/DL (ref 7–20)
CALCIUM SERPL-MCNC: 8.5 MG/DL (ref 8.3–10.6)
CHLORIDE BLD-SCNC: 99 MMOL/L (ref 99–110)
CO2: 24 MMOL/L (ref 21–32)
CREAT SERPL-MCNC: 0.5 MG/DL (ref 0.6–1.2)
EOSINOPHILS ABSOLUTE: 0.1 K/UL (ref 0–0.6)
EOSINOPHILS RELATIVE PERCENT: 1.5 %
GFR AFRICAN AMERICAN: >60
GFR NON-AFRICAN AMERICAN: >60
GLUCOSE BLD-MCNC: 93 MG/DL (ref 70–99)
HCT VFR BLD CALC: 37.2 % (ref 36–48)
HEMOGLOBIN: 12.8 G/DL (ref 12–16)
LYMPHOCYTES ABSOLUTE: 1.2 K/UL (ref 1–5.1)
LYMPHOCYTES RELATIVE PERCENT: 12.8 %
MCH RBC QN AUTO: 32.8 PG (ref 26–34)
MCHC RBC AUTO-ENTMCNC: 34.4 G/DL (ref 31–36)
MCV RBC AUTO: 95.3 FL (ref 80–100)
MONOCYTES ABSOLUTE: 1 K/UL (ref 0–1.3)
MONOCYTES RELATIVE PERCENT: 10.6 %
NEUTROPHILS ABSOLUTE: 7 K/UL (ref 1.7–7.7)
NEUTROPHILS RELATIVE PERCENT: 74.4 %
PDW BLD-RTO: 12.9 % (ref 12.4–15.4)
PLATELET # BLD: 242 K/UL (ref 135–450)
PMV BLD AUTO: 6.6 FL (ref 5–10.5)
POTASSIUM SERPL-SCNC: 4.2 MMOL/L (ref 3.5–5.1)
RBC # BLD: 3.91 M/UL (ref 4–5.2)
SODIUM BLD-SCNC: 134 MMOL/L (ref 136–145)
WBC # BLD: 9.4 K/UL (ref 4–11)

## 2022-06-29 PROCEDURE — 36415 COLL VENOUS BLD VENIPUNCTURE: CPT

## 2022-06-29 PROCEDURE — 6360000002 HC RX W HCPCS: Performed by: STUDENT IN AN ORGANIZED HEALTH CARE EDUCATION/TRAINING PROGRAM

## 2022-06-29 PROCEDURE — 1200000000 HC SEMI PRIVATE

## 2022-06-29 PROCEDURE — 80048 BASIC METABOLIC PNL TOTAL CA: CPT

## 2022-06-29 PROCEDURE — 99233 SBSQ HOSP IP/OBS HIGH 50: CPT | Performed by: INTERNAL MEDICINE

## 2022-06-29 PROCEDURE — 85025 COMPLETE CBC W/AUTO DIFF WBC: CPT

## 2022-06-29 PROCEDURE — 92526 ORAL FUNCTION THERAPY: CPT

## 2022-06-29 PROCEDURE — 6370000000 HC RX 637 (ALT 250 FOR IP): Performed by: INTERNAL MEDICINE

## 2022-06-29 PROCEDURE — 2580000003 HC RX 258: Performed by: STUDENT IN AN ORGANIZED HEALTH CARE EDUCATION/TRAINING PROGRAM

## 2022-06-29 PROCEDURE — 6370000000 HC RX 637 (ALT 250 FOR IP): Performed by: STUDENT IN AN ORGANIZED HEALTH CARE EDUCATION/TRAINING PROGRAM

## 2022-06-29 PROCEDURE — 92507 TX SP LANG VOICE COMM INDIV: CPT

## 2022-06-29 PROCEDURE — 94760 N-INVAS EAR/PLS OXIMETRY 1: CPT

## 2022-06-29 PROCEDURE — 71045 X-RAY EXAM CHEST 1 VIEW: CPT

## 2022-06-29 RX ORDER — ACETAMINOPHEN 325 MG/1
650 TABLET ORAL EVERY 4 HOURS PRN
Status: DISCONTINUED | OUTPATIENT
Start: 2022-06-29 | End: 2022-06-30 | Stop reason: HOSPADM

## 2022-06-29 RX ADMIN — CEFEPIME HYDROCHLORIDE 2000 MG: 2 INJECTION, POWDER, FOR SOLUTION INTRAVENOUS at 00:55

## 2022-06-29 RX ADMIN — LAMOTRIGINE 50 MG: 25 TABLET ORAL at 09:31

## 2022-06-29 RX ADMIN — ENOXAPARIN SODIUM 40 MG: 100 INJECTION SUBCUTANEOUS at 09:32

## 2022-06-29 RX ADMIN — ALPRAZOLAM 0.25 MG: 0.25 TABLET ORAL at 20:27

## 2022-06-29 RX ADMIN — ACETAMINOPHEN 650 MG: 325 TABLET ORAL at 12:58

## 2022-06-29 RX ADMIN — OXCARBAZEPINE 300 MG: 300 TABLET, FILM COATED ORAL at 09:31

## 2022-06-29 RX ADMIN — CEFEPIME HYDROCHLORIDE 2000 MG: 2 INJECTION, POWDER, FOR SOLUTION INTRAVENOUS at 14:40

## 2022-06-29 RX ADMIN — AMLODIPINE BESYLATE 5 MG: 5 TABLET ORAL at 09:32

## 2022-06-29 RX ADMIN — OXCARBAZEPINE 300 MG: 300 TABLET, FILM COATED ORAL at 20:27

## 2022-06-29 RX ADMIN — LABETALOL HYDROCHLORIDE 100 MG: 100 TABLET, FILM COATED ORAL at 09:32

## 2022-06-29 RX ADMIN — LAMOTRIGINE 100 MG: 100 TABLET ORAL at 20:27

## 2022-06-29 RX ADMIN — LABETALOL HYDROCHLORIDE 100 MG: 100 TABLET, FILM COATED ORAL at 20:27

## 2022-06-29 RX ADMIN — ASPIRIN 81 MG: 81 TABLET, COATED ORAL at 09:32

## 2022-06-29 RX ADMIN — ATORVASTATIN CALCIUM 80 MG: 80 TABLET, FILM COATED ORAL at 20:27

## 2022-06-29 ASSESSMENT — PAIN SCALES - WONG BAKER
WONGBAKER_NUMERICALRESPONSE: 0

## 2022-06-29 NOTE — PROGRESS NOTES
Speech Language/Pathology   SPEECH LANGUAGE AND CLINICAL BEDSIDE SWALLOWING TREATMENT  Speech Therapy Department       Cristian Durán  AGE: 80 y.o. GENDER: female  : 1925  [id number]  EPISODE DATE:  2022    MEDICAL DIAGNOSIS: CVA  ONSET: 2022     Chief Complaint   Patient presents with    Extremity Weakness     Pt brought in by Ara N Manuel Mancera from [facility] for right sided weakness that started yesterday. States pt has pneumonia and was previously treated. Went nonverbal yesterday morning and started with right sided weakness. PAST MEDICAL HISTORY      Diagnosis Date    Hematuria, microscopic      PAST SURGICAL HISTORY  Past Surgical History:   Procedure Laterality Date    COLONOSCOPY      Dr. Amos Green said it is not necessary to have anymore    HYSTERECTOMY (CERVIX STATUS UNKNOWN)      TONSILLECTOMY AND ADENOIDECTOMY       ALLERGIES  Allergies   Allergen Reactions    Penicillins      CHART REVIEW:  2022 admitted with c/o CVA  2022 Neuro consult noted     IMAGING:  CXR: 2022  Impression   Borderline cardiomegaly and minimal central pulmonary congestion or pulmonary   artery hypertension.       Mild increased interstitial markings throughout which appears chronic and   mild discoid atelectasis or scarring along the lung bases.  No obvious   infiltrate effusion is seen.      BRAIN MRI: 2022  Impression   1. Acute infarct within the medial left frontal lobe in the left anterior   cerebral artery territory. 2. No acute intracranial hemorrhage. 3. Mild diffuse cerebral volume loss and chronic small vessel ischemic   changes.    4. Moderate spinal canal stenosis at C1-C2 secondary to degenerative   overgrowth surrounding the dens.      Vision  Vision:  (macular degeneration; requires extra large print)  Hearing  Hearing:  (adequate for assessment needs)     Prior Status: resides at [facility]; recently transferred from assisted living to progressive care; received assistance for ADLs; dependent for IADLs, finance management, and med management; not an active ; completed high school; homemaker; enjoys craCaseroing and arts    Subjective:     Current diet: Regular/Thin  Pt/staff statements regarding diet tolerance: family reports decreased coughing with PO since last seen 6/27/2022; continued cough with PO reported, though    Cognitive-communication treatment progress: remains nonverbal; reported to follow basic dx and visually track per family    Objective: Current Therapy Impression of progress/goal status    IMPRESSIONS  Dysphagia Diagnosis:   Concern for moderate oral stage dysphagia and moderate-severe pharyngeal stage dysphagia characterized by decreased mastication, decreased lingula manipulation, delayed swallow, and decreased laryngeal elevation. Prolonged but adequate bolus formation and movement with all PO; concern for potentially excess effort/labor with regular solids - recommend consideration for easy to chew vs soft/bite size solids as tolerated. No cough with PO trials this date, but concern for potential subtle/weak throat clear with thin liquid trials. Recommend downgrade to soft/nectar with clinical monitor for tolerance.     SLP Diagnosis: Patient nonverbal and not following dx or responding to questions. Appears to present with global aphasia with suspected underlying cognitive-language impairments that were reported as present at baseline with need for cues for sequencing basic tasks.     Recommended Diet:  Diet Solids Recommendation: Soft/Bite-size  Liquid Consistency Recommendation: Mildly/Nectar Thick  Meds: Crushed in puree/pudding  Compensatory Swallowing Strategies : Upright as possible for all oral intake,Remain upright for 30-45 minutes after meals,Eat/Feed slowly,Small bites/sips,Swallow 2 times per bite/sip    Status of Dysphagia Goals:    The patient will tolerate ongoing clinical monitor/re-assessment for most optimal PO diet as tolerated. continue    Goal Status: Speech and Language Goals  Short-term Goals  Goal 1: Patient will vocalize 1/5 trials with max cues not met  Goal 2: Patient will demonstrate basic comprehension for picture/object/yes-no id with max cues not met    Prognosis  Dysphagia Prognosis: Guarded d/t reported baseline cough with and without PO  SLP Prognosis: Fair d/t severity of current imps    Education  Consulted and agree with results and recommendations: Patient,RN,Family members Irene Kaufman and Cookie Partida)  Patient Education Response: No evidence of learning      Discharge Recommendations:   Pt will benefit from continued skilled Speech Therapy for Speech and Dysphagia services    Please accept this as Speech Therapy Discharge status, if pt is discharged prior to next therapy session.     Objective: Assessment/Therapy activities    Treatment this session  Objective:  COMPREHENSION  Auditory Comprehension: []WFL []Mild   [] Moderate  [x]Severe  []To be assessed  Impaired Yes/no questions  Impaired Basic questions  Impaired One step commands  EXPRESSION  Verbal Expression: []WFL []Mild   [] Moderate  [x]Severe  []To be assessed  Impaired Initiation  Impaired Repetition  Impaired Automatic Speech  Impaired Confrontational Naming  Impaired Responsive Naming  Pragmatics/Social Functioning: []WFL []Mild   [x] Moderate  []Severe  []To be assessed  Impaired Eye contact  Flat Affect      MOTOR SPEECH  Motor Speech: []WFL []Mild   [] Moderate  []Severe  [x]To be assessed   []Apraxia   []Dysarthria   []Decreased Intelligibility    VOICE  Voice: []WFL []Mild   [] Moderate  []Severe  [x]To be assessed    COGNITION  [x]Unable to be assessed secondary to Aphasia   Overall Orientation : []WFL []Mild   [] Moderate  []Severe  []To be assessed   [x]Unable to be assessed secondary to Aphasia   Attention: []WFL []Mild   [] Moderate  []Severe  [x]To be assessed  Memory: []WFL []Mild   [] Moderate  []Severe  [x]To be assessed  Problem Solving: []WFL []Mild   [] Moderate  []Severe  [x]To be assessed  Safety/Judgement: []WFL []Mild   [] Moderate  []Severe  [x]To be assessed    DYSPHAGIA TREATMENT   Positioning: Fully upright in bed   PO Trials:  · Thin Liquids: reduced labial seal with anterior spillage occasionally; reduced bolus control; oral holding; suspect premature bolus loss to the pharynx; delayed swallow; decreased laryngeal elevation; subtle (barely audible) throat clear  · Nectar thick liquids: reduced bolus control; oral holding; suspect premature bolus loss to the pharynx; delayed swallow; decreased laryngeal elevation; no overt signs/symptoms of penetration/aspiration  · Honey Thick liquids: reduced bolus control; oral holding; suspect premature bolus loss to the pharynx; delayed swallow; decreased laryngeal elevation; no overt signs/symptoms of penetration/aspiration  · Puree: reduced bolus control; oral holding; suspect premature bolus loss to the pharynx; delayed swallow; decreased laryngeal elevation; no overt signs/symptoms of penetration/aspiration   · Soft food: prolonged but adequate mastication; reduced bolus control; oral holding; suspect premature bolus loss to the pharynx; delayed swallow; decreased laryngeal elevation; no overt signs/symptoms of penetration/aspiration  · Regular food: DNT  Patient/Family education: family at bedside; indicates understanding of current recs/rationale    Plan   Requires SLP Intervention: Yes  Therapeutic Interventions: Diet tolerance monitoring,Patient/Family education,Therapeutic PO trials with SLP, Aphasia rehab  Duration of Treatment: ST to tx 3-5 times per week during acute admission     Timed Code Treatment: 0  Total Treatment Time: 60  11:25 AM - 12:25 PM     Signed:  Jacquie Martínez, [id number] [place] [address], [id number]  Speech-Language Pathologist  Portable phone: [phone]  06/29/22 12:25 PM

## 2022-06-29 NOTE — PROGRESS NOTES
[facility] Progress Note  6/29/2022 8:15 AM  Subjective:   Admit Date: 6/26/2022      Chief Complaint: Still non-verbal     Interval History: Rt body remains weak-exp aphasia persists   Echo--no evidence of cardiac emboli   BP is stable   Gloria diet fairly well   Sleeping this AM--xanax has helped--per meagan--who is at bedside   Diet: ADULT DIET; Regular  Medications:   Scheduled Meds:   lamoTRIgine  100 mg Oral Nightly    enoxaparin  40 mg SubCUTAneous Daily    aspirin  81 mg Oral Daily    Or    aspirin  300 mg Rectal Daily    atorvastatin  80 mg Oral Nightly    amLODIPine  5 mg Oral Daily    labetalol  100 mg Oral 2 times per day    lamoTRIgine  50 mg Oral Daily    OXcarbazepine  300 mg Oral BID    cefepime  2,000 mg IntraVENous Q12H     Continuous Infusions:    Review of Systems -   General ROS: afebrile  Respiratory ROS: no cough, shortness of breath or wheezing  Cardiovascular ROS: no chest pain  Musculoskeletal ROS:positive for - :joint pain  Neurological ROS: positive for - speech problems    Objective:   Vitals: /63   Pulse 84   Temp 98 °F (36.7 °C) (Oral)   Resp 22   Ht 5' 1\" (1.549 m)   Wt 136 lb 3.9 oz (61.8 kg)   SpO2 96%   BMI 25.74 kg/m²   General appearance: alert and cooperative with exam  HEENT: Head: Normocephalic, no lesions, without obvious abnormality.   Neck: no adenopathy, no carotid bruit, no JVD, supple, symmetrical, trachea midline and thyroid not enlarged, symmetric, no tenderness/mass/nodules  Lungs: clear to auscultation bilaterally  Heart: regular rate and rhythm, S1, S2 normal, no murmur, click, rub or gallop  Abdomen: soft, non-tender; bowel sounds normal; no masses,  no organomegaly  Extremities: extremities normal, atraumatic, no cyanosis or edema  Neurologic: Mental status: rt sided weakness--no sig change     Admission on 06/26/2022   Component Date Value Ref Range Status    WBC 06/26/2022 6.6  4.0 - 11.0 K/uL Final    RBC 06/26/2022 4.02  4.00 - 5.20 M/uL Final    Hemoglobin 06/26/2022 12.8  12.0 - 16.0 g/dL Final    Hematocrit 06/26/2022 38.1  36.0 - 48.0 % Final    MCV 06/26/2022 94.9  80.0 - 100.0 fL Final    MCH 06/26/2022 32.0  26.0 - 34.0 pg Final    MCHC 06/26/2022 33.7  31.0 - 36.0 g/dL Final    RDW 06/26/2022 13.1  12.4 - 15.4 % Final    Platelets 68/30/7681 269  135 - 450 K/uL Final    MPV 06/26/2022 6.9  5.0 - 10.5 fL Final    Neutrophils % 06/26/2022 67.0  % Final    Lymphocytes % 06/26/2022 16.7  % Final    Monocytes % 06/26/2022 14.0  % Final    Eosinophils % 06/26/2022 1.6  % Final    Basophils % 06/26/2022 0.7  % Final    Neutrophils Absolute 06/26/2022 4.4  1.7 - 7.7 K/uL Final    Lymphocytes Absolute 06/26/2022 1.1  1.0 - 5.1 K/uL Final    Monocytes Absolute 06/26/2022 0.9  0.0 - 1.3 K/uL Final    Eosinophils Absolute 06/26/2022 0.1  0.0 - 0.6 K/uL Final    Basophils Absolute 06/26/2022 0.0  0.0 - 0.2 K/uL Final    Sodium 06/26/2022 132* 136 - 145 mmol/L Final    Potassium 06/26/2022 4.5  3.5 - 5.1 mmol/L Final    Chloride 06/26/2022 97* 99 - 110 mmol/L Final    CO2 06/26/2022 27  21 - 32 mmol/L Final    Anion Gap 06/26/2022 8  3 - 16 Final    Glucose 06/26/2022 106* 70 - 99 mg/dL Final    BUN 06/26/2022 12  7 - 20 mg/dL Final    CREATININE 06/26/2022 0.6  0.6 - 1.2 mg/dL Final    GFR Non- 06/26/2022 >60  >60 Final    Comment: >60 mL/min/1.73m2 EGFR, calc. for ages 25 and older using the  MDRD formula (not corrected for weight), is valid for stable  renal function.  GFR  06/26/2022 >60  >60 Final    Comment: Chronic Kidney Disease: less than 60 ml/min/1.73 sq.m. Kidney Failure: less than 15 ml/min/1.73 sq.m. Results valid for patients 18 years and older.       Calcium 06/26/2022 8.6  8.3 - 10.6 mg/dL Final    Total Protein 06/26/2022 6.1* 6.4 - 8.2 g/dL Final    Albumin 06/26/2022 3.6  3.4 - 5.0 g/dL Final    Albumin/Globulin Ratio 06/26/2022 1.4  1.1 - 2.2 Final    Total Bilirubin 06/26/2022 0.3  0.0 - 1.0 mg/dL Final    Alkaline Phosphatase 06/26/2022 61  40 - 129 U/L Final    ALT 06/26/2022 7* 10 - 40 U/L Final    AST 06/26/2022 18  15 - 37 U/L Final    Troponin 06/26/2022 0.01  <0.01 ng/mL Final    Methodology by Troponin T    Lamotrigine Lvl 06/26/2022 2.3* 3.0 - 15.0 ug/mL Final    Comment: INTERPRETIVE INFORMATION:  Lamotrigine  Therapeutic Range:  3.0-15.0 ug/mL              Toxic:  Greater than or equal to 20 ug/mL  Pharmacokinetics varies widely, particularly with co-medications and/or  compromised renal function. Adverse effects may include dizziness,  somnolence, nausea and vomiting. Performed By: Orville Higgins  [facility] [address]  [place], [address] [facility]  : Deondre Leon. Jessie Barton MD      pH, Claus 06/26/2022 7.454* 7.350 - 7.450 Final    pCO2, Claus 06/26/2022 41.8  40.0 - 50.0 mmHg Final    pO2, Claus 06/26/2022 50  Not Established mmHg Final    HCO3, Venous 06/26/2022 29  23 - 29 mmol/L Final    Base Excess, Claus 06/26/2022 4.9  Not Established mmol/L Final    O2 Sat, Claus 06/26/2022 86  Not Established % Final    Carboxyhemoglobin 06/26/2022 1.3  % Final    Comment:      0.0-1.5  (Smokers 1.5-5.0)      MetHgb, Claus 06/26/2022 0.5  <1.5 % Final    TC02 (Calc), Claus 06/26/2022 31  Not Established mmol/L Final    O2 Therapy 06/26/2022 Unknown   Final    Pro-BNP 06/26/2022 1,196* 0 - 449 pg/mL Final    Comment: Methodology by NT-proBNP    An age-independent cutoff point of 300 pg/ml has a 98%  negative predictive value excluding acute heart failure. Values exceeding the age-related cutoff values (450 pg/mL if  age<50, 900 if 50-75 and 1800 if >75) has 90% sensitivity and  84% specificity for diagnosing acute HF. In patients with  renal compromise (eGFR<60) values greater than 1200pg/ml have  a diagnostic sensitivity and specificity of 89% and 72% for  acute HF.       Color, UA 06/26/2022 Yellow  Straw/Yellow Final    Clarity, UA 06/26/2022 Clear Clear Final    Glucose, Ur 06/26/2022 Negative  Negative mg/dL Final    Bilirubin Urine 06/26/2022 Negative  Negative Final    Ketones, Urine 06/26/2022 Negative  Negative mg/dL Final    Specific [place], UA 06/26/2022 1.013  1.005 - 1.030 Final    Blood, Urine 06/26/2022 Negative  Negative Final    pH, UA 06/26/2022 5.0  5.0 - 8.0 Final    Protein, UA 06/26/2022 Negative  Negative mg/dL Final    Urobilinogen, Urine 06/26/2022 0.2  <2.0 E.U./dL Final    Nitrite, Urine 06/26/2022 Negative  Negative Final    Leukocyte Esterase, Urine 06/26/2022 Negative  Negative Final    Microscopic Examination 06/26/2022 Not Indicated   Final    Urine Type 06/26/2022 Other   Final    Urine Reflex to Culture 06/26/2022 Not Indicated   Final    Ventricular Rate 06/26/2022 87  BPM Final    Atrial Rate 06/26/2022 87  BPM Final    P-R Interval 06/26/2022 162  ms Final    QRS Duration 06/26/2022 78  ms Final    Q-T Interval 06/26/2022 370  ms Final    QTc Calculation (Bazett) 06/26/2022 445  ms Final    P Axis 06/26/2022 35  degrees Final    R Axis 06/26/2022 0  degrees Final    T Axis 06/26/2022 18  degrees Final    Diagnosis 06/26/2022 Normal sinus rhythmPoor R wave progressionAbnormal ECGWhen compared with ECG of 16-JUN-2019 06:56,No significant change was foundConfirmed by YANDY RAMOS MD ([id number]) on 6/27/2022 11:25:40 AM   Final    WBC 06/27/2022 8.4  4.0 - 11.0 K/uL Final    RBC 06/27/2022 3.93* 4.00 - 5.20 M/uL Final    Hemoglobin 06/27/2022 12.7  12.0 - 16.0 g/dL Final    Hematocrit 06/27/2022 37.2  36.0 - 48.0 % Final    MCV 06/27/2022 94.7  80.0 - 100.0 fL Final    MCH 06/27/2022 32.3  26.0 - 34.0 pg Final    MCHC 06/27/2022 34.1  31.0 - 36.0 g/dL Final    RDW 06/27/2022 12.7  12.4 - 15.4 % Final    Platelets 92/22/0032 281  135 - 450 K/uL Final    MPV 06/27/2022 6.7  5.0 - 10.5 fL Final    Hemoglobin A1C 06/27/2022 5.4  See comment % Final    Comment: Comment:  Diagnosis of Diabetes: > or = 6.5%  Increased risk of diabetes (Prediabetes): 5.7-6.4%  Glycemic Control: Nonpregnant Adults: <7.0%                    Pregnant: <6.0%        eAG 06/27/2022 108.3  mg/dL Final    Cholesterol, Total 06/27/2022 174  0 - 199 mg/dL Final    Triglycerides 06/27/2022 119  0 - 150 mg/dL Final    HDL 06/27/2022 38* 40 - 60 mg/dL Final    LDL Calculated 06/27/2022 112* <100 mg/dL Final    VLDL Cholesterol Calculated 06/27/2022 24  Not Established mg/dL Final    Sodium 06/27/2022 137  136 - 145 mmol/L Final    Potassium reflex Magnesium 06/27/2022 4.6  3.5 - 5.1 mmol/L Final    Chloride 06/27/2022 102  99 - 110 mmol/L Final    CO2 06/27/2022 25  21 - 32 mmol/L Final    Anion Gap 06/27/2022 10  3 - 16 Final    Glucose 06/27/2022 108* 70 - 99 mg/dL Final    BUN 06/27/2022 13  7 - 20 mg/dL Final    CREATININE 06/27/2022 0.5* 0.6 - 1.2 mg/dL Final    GFR Non- 06/27/2022 >60  >60 Final    Comment: >60 mL/min/1.73m2 EGFR, calc. for ages 25 and older using the  MDRD formula (not corrected for weight), is valid for stable  renal function.  GFR  06/27/2022 >60  >60 Final    Comment: Chronic Kidney Disease: less than 60 ml/min/1.73 sq.m. Kidney Failure: less than 15 ml/min/1.73 sq.m. Results valid for patients 18 years and older.  Calcium 06/27/2022 8.7  8.3 - 10.6 mg/dL Final    Lamotrigine Lvl 06/27/2022 1.9* 3.0 - 15.0 ug/mL Final    Comment: INTERPRETIVE INFORMATION:  Lamotrigine  Therapeutic Range:  3.0-15.0 ug/mL              Toxic:  Greater than or equal to 20 ug/mL  Pharmacokinetics varies widely, particularly with co-medications and/or  compromised renal function. Adverse effects may include dizziness,  somnolence, nausea and vomiting. Performed By: Christian Newton [address]  [place], [address] [facility]  : Beatriz Aguillon.  Sofía Hinojosa MD      WBC 06/28/2022 8.0  4.0 - 11.0 K/uL Final    RBC 06/28/2022 3.85* 4.00 - 5.20 M/uL Final    Hemoglobin 06/28/2022 12.5  12.0 - 16.0 g/dL Final    Hematocrit 06/28/2022 36.2  36.0 - 48.0 % Final    MCV 06/28/2022 94.2  80.0 - 100.0 fL Final    MCH 06/28/2022 32.6  26.0 - 34.0 pg Final    MCHC 06/28/2022 34.6  31.0 - 36.0 g/dL Final    RDW 06/28/2022 12.9  12.4 - 15.4 % Final    Platelets 42/79/2426 284  135 - 450 K/uL Final    MPV 06/28/2022 6.7  5.0 - 10.5 fL Final    Neutrophils % 06/28/2022 68.8  % Final    Lymphocytes % 06/28/2022 18.1  % Final    Monocytes % 06/28/2022 9.8  % Final    Eosinophils % 06/28/2022 2.1  % Final    Basophils % 06/28/2022 1.2  % Final    Neutrophils Absolute 06/28/2022 5.5  1.7 - 7.7 K/uL Final    Lymphocytes Absolute 06/28/2022 1.5  1.0 - 5.1 K/uL Final    Monocytes Absolute 06/28/2022 0.8  0.0 - 1.3 K/uL Final    Eosinophils Absolute 06/28/2022 0.2  0.0 - 0.6 K/uL Final    Basophils Absolute 06/28/2022 0.1  0.0 - 0.2 K/uL Final    Sodium 06/28/2022 132* 136 - 145 mmol/L Final    Potassium 06/28/2022 4.3  3.5 - 5.1 mmol/L Final    Chloride 06/28/2022 98* 99 - 110 mmol/L Final    CO2 06/28/2022 21  21 - 32 mmol/L Final    Anion Gap 06/28/2022 13  3 - 16 Final    Glucose 06/28/2022 117* 70 - 99 mg/dL Final    BUN 06/28/2022 16  7 - 20 mg/dL Final    CREATININE 06/28/2022 <0.5* 0.6 - 1.2 mg/dL Final    GFR Non- 06/28/2022 >60  >60 Final    Comment: >60 mL/min/1.73m2 EGFR, calc. for ages 25 and older using the  MDRD formula (not corrected for weight), is valid for stable  renal function.  GFR  06/28/2022 >60  >60 Final    Comment: Chronic Kidney Disease: less than 60 ml/min/1.73 sq.m. Kidney Failure: less than 15 ml/min/1.73 sq.m. Results valid for patients 18 years and older.       Calcium 06/28/2022 8.4  8.3 - 10.6 mg/dL Final    Total Protein 06/28/2022 6.1* 6.4 - 8.2 g/dL Final    Albumin 06/28/2022 3.3* 3.4 - 5.0 g/dL Final    Albumin/Globulin Ratio 06/28/2022 1.2  1.1 - 2.2 Final    Total Bilirubin 06/28/2022 0.4  0.0 - 1.0 mg/dL Final    Alkaline Phosphatase 06/28/2022 59  40 - 129 U/L Final    ALT 06/28/2022 8* 10 - 40 U/L Final    AST 06/28/2022 20  15 - 37 U/L Final    WBC 06/29/2022 9.4  4.0 - 11.0 K/uL Final    RBC 06/29/2022 3.91* 4.00 - 5.20 M/uL Final    Hemoglobin 06/29/2022 12.8  12.0 - 16.0 g/dL Final    Hematocrit 06/29/2022 37.2  36.0 - 48.0 % Final    MCV 06/29/2022 95.3  80.0 - 100.0 fL Final    MCH 06/29/2022 32.8  26.0 - 34.0 pg Final    MCHC 06/29/2022 34.4  31.0 - 36.0 g/dL Final    RDW 06/29/2022 12.9  12.4 - 15.4 % Final    Platelets 26/26/7757 242  135 - 450 K/uL Final    MPV 06/29/2022 6.6  5.0 - 10.5 fL Final    Neutrophils % 06/29/2022 74.4  % Final    Lymphocytes % 06/29/2022 12.8  % Final    Monocytes % 06/29/2022 10.6  % Final    Eosinophils % 06/29/2022 1.5  % Final    Basophils % 06/29/2022 0.7  % Final    Neutrophils Absolute 06/29/2022 7.0  1.7 - 7.7 K/uL Final    Lymphocytes Absolute 06/29/2022 1.2  1.0 - 5.1 K/uL Final    Monocytes Absolute 06/29/2022 1.0  0.0 - 1.3 K/uL Final    Eosinophils Absolute 06/29/2022 0.1  0.0 - 0.6 K/uL Final    Basophils Absolute 06/29/2022 0.1  0.0 - 0.2 K/uL Final    Sodium 06/29/2022 134* 136 - 145 mmol/L Final    Potassium 06/29/2022 4.2  3.5 - 5.1 mmol/L Final    Chloride 06/29/2022 99  99 - 110 mmol/L Final    CO2 06/29/2022 24  21 - 32 mmol/L Final    Anion Gap 06/29/2022 11  3 - 16 Final    Glucose 06/29/2022 93  70 - 99 mg/dL Final    BUN 06/29/2022 13  7 - 20 mg/dL Final    CREATININE 06/29/2022 0.5* 0.6 - 1.2 mg/dL Final    GFR Non- 06/29/2022 >60  >60 Final    Comment: >60 mL/min/1.73m2 EGFR, calc. for ages 25 and older using the  MDRD formula (not corrected for weight), is valid for stable  renal function.  GFR  06/29/2022 >60  >60 Final    Comment: Chronic Kidney Disease: less than 60 ml/min/1.73 sq.m. Kidney Failure: less than 15 ml/min/1.73 sq.m.   Results

## 2022-06-29 NOTE — PLAN OF CARE
Problem: Discharge Planning  Goal: Discharge to home or other facility with appropriate resources  Outcome: Progressing  Flowsheets  Taken 6/29/2022 0826 by Simi De Luna RN  Discharge to home or other facility with appropriate resources:   Identify barriers to discharge with patient and caregiver   Arrange for needed discharge resources and transportation as appropriate  Taken 6/29/2022 0451 by Mhedi Smith RN  Discharge to home or other facility with appropriate resources:   Identify barriers to discharge with patient and caregiver   Identify discharge learning needs (meds, wound care, etc)   Refer to discharge planning if patient needs post-hospital services based on physician order or complex needs related to functional status, cognitive ability or social support system     Problem: Pain  Goal: Verbalizes/displays adequate comfort level or baseline comfort level  Outcome: Progressing     Problem: Confusion  Goal: Confusion, delirium, dementia, or psychosis is improved or at baseline  Description: INTERVENTIONS:  1. Assess for possible contributors to thought disturbance, including medications, impaired vision or hearing, underlying metabolic abnormalities, dehydration, psychiatric diagnoses, and notify attending LIP  2. [place] high risk fall precautions, as indicated  3. Provide frequent short contacts to provide reality reorientation, refocusing and direction  4. Decrease environmental stimuli, including noise as appropriate  5. Monitor and intervene to maintain adequate nutrition, hydration, elimination, sleep and activity  6. If unable to ensure safety without constant attention obtain sitter and review sitter guidelines with assigned personnel  7.  Initiate Psychosocial CNS and Spiritual Care consult, as indicated  Outcome: Progressing  Flowsheets  Taken 6/29/2022 0826 by Simi De Luna RN  Effect of thought disturbance (confusion, delirium, dementia, or psychosis) are managed with adequate functional status:   Assess for contributors to thought disturbance, including medications, impaired vision or hearing, underlying metabolic abnormalities, dehydration, psychiatric diagnoses, notify LIP   Provide frequent short contacts to provide reality reorientation, refocusing and direction   Decrease environmental stimuli, including noise as appropriate  Taken 6/29/2022 0451 by Lieutenant Bill RN  Effect of thought disturbance (confusion, delirium, dementia, or psychosis) are managed with adequate functional status:   Assess for contributors to thought disturbance, including medications, impaired vision or hearing, underlying metabolic abnormalities, dehydration, psychiatric diagnoses, notify [facility] high risk fall precautions, as indicated   Provide frequent short contacts to provide reality reorientation, refocusing and direction   Decrease environmental stimuli, including noise as appropriate   Monitor and intervene to maintain adequate nutrition, hydration, elimination, sleep and activity     Problem: Skin/Tissue Integrity  Goal: Absence of new skin breakdown  Description: 1. Monitor for areas of redness and/or skin breakdown  2. Assess vascular access sites hourly  3. Every 4-6 hours minimum:  Change oxygen saturation probe site  4. Every 4-6 hours:  If on nasal continuous positive airway pressure, respiratory therapy assess nares and determine need for appliance change or resting period.   Outcome: Progressing     Problem: Safety - Adult  Goal: Free from fall injury  Outcome: Progressing     Problem: Chronic Conditions and Co-morbidities  Goal: Patient's chronic conditions and co-morbidity symptoms are monitored and maintained or improved  Outcome: Progressing  Flowsheets  Taken 6/29/2022 0804 by Teetee Smith RN  Care Plan - Patient's Chronic Conditions and Co-Morbidity Symptoms are Monitored and Maintained or Improved: Monitor and assess patient's chronic conditions and comorbid symptoms for stability, deterioration, or improvement  Taken 6/29/2022 0451 by Richard Harris 34 - Patient's Chronic Conditions and Co-Morbidity Symptoms are Monitored and Maintained or Improved:   Monitor and assess patient's chronic conditions and comorbid symptoms for stability, deterioration, or improvement   Collaborate with multidisciplinary team to address chronic and comorbid conditions and prevent exacerbation or deterioration   Update acute care plan with appropriate goals if chronic or comorbid symptoms are exacerbated and prevent overall improvement and discharge     Problem: ABCDS Injury Assessment  Goal: Absence of physical injury  Outcome: Progressing

## 2022-06-30 VITALS
BODY MASS INDEX: 26.26 KG/M2 | WEIGHT: 139.11 LBS | OXYGEN SATURATION: 94 % | SYSTOLIC BLOOD PRESSURE: 130 MMHG | RESPIRATION RATE: 19 BRPM | HEART RATE: 69 BPM | DIASTOLIC BLOOD PRESSURE: 53 MMHG | TEMPERATURE: 99.3 F | HEIGHT: 61 IN

## 2022-06-30 PROBLEM — F41.9 ANXIETY: Status: ACTIVE | Noted: 2022-06-30

## 2022-06-30 LAB
ANION GAP SERPL CALCULATED.3IONS-SCNC: 10 MMOL/L (ref 3–16)
BUN BLDV-MCNC: 17 MG/DL (ref 7–20)
CALCIUM SERPL-MCNC: 8.3 MG/DL (ref 8.3–10.6)
CHLORIDE BLD-SCNC: 103 MMOL/L (ref 99–110)
CO2: 25 MMOL/L (ref 21–32)
CREAT SERPL-MCNC: <0.5 MG/DL (ref 0.6–1.2)
GFR AFRICAN AMERICAN: >60
GFR NON-AFRICAN AMERICAN: >60
GLUCOSE BLD-MCNC: 96 MG/DL (ref 70–99)
POTASSIUM SERPL-SCNC: 4 MMOL/L (ref 3.5–5.1)
SARS-COV-2, NAAT: NOT DETECTED
SODIUM BLD-SCNC: 138 MMOL/L (ref 136–145)

## 2022-06-30 PROCEDURE — 87635 SARS-COV-2 COVID-19 AMP PRB: CPT

## 2022-06-30 PROCEDURE — 6370000000 HC RX 637 (ALT 250 FOR IP): Performed by: STUDENT IN AN ORGANIZED HEALTH CARE EDUCATION/TRAINING PROGRAM

## 2022-06-30 PROCEDURE — 99239 HOSP IP/OBS DSCHRG MGMT >30: CPT | Performed by: INTERNAL MEDICINE

## 2022-06-30 PROCEDURE — 92526 ORAL FUNCTION THERAPY: CPT

## 2022-06-30 PROCEDURE — 36415 COLL VENOUS BLD VENIPUNCTURE: CPT

## 2022-06-30 PROCEDURE — 6360000002 HC RX W HCPCS: Performed by: STUDENT IN AN ORGANIZED HEALTH CARE EDUCATION/TRAINING PROGRAM

## 2022-06-30 PROCEDURE — 94760 N-INVAS EAR/PLS OXIMETRY 1: CPT

## 2022-06-30 PROCEDURE — 2580000003 HC RX 258: Performed by: STUDENT IN AN ORGANIZED HEALTH CARE EDUCATION/TRAINING PROGRAM

## 2022-06-30 PROCEDURE — 6370000000 HC RX 637 (ALT 250 FOR IP): Performed by: INTERNAL MEDICINE

## 2022-06-30 PROCEDURE — 92507 TX SP LANG VOICE COMM INDIV: CPT

## 2022-06-30 PROCEDURE — 80048 BASIC METABOLIC PNL TOTAL CA: CPT

## 2022-06-30 RX ORDER — ALPRAZOLAM 0.25 MG/1
0.25 TABLET ORAL 2 TIMES DAILY PRN
Status: SHIPPED | OUTPATIENT
Start: 2022-06-30 | End: 2022-07-30

## 2022-06-30 RX ORDER — ASPIRIN 300 MG/1
300 SUPPOSITORY RECTAL DAILY
Qty: 30 SUPPOSITORY | Refills: 3 | DISCHARGE
Start: 2022-06-30

## 2022-06-30 RX ORDER — ASPIRIN 81 MG/1
81 TABLET ORAL DAILY
Qty: 30 TABLET | Refills: 3 | DISCHARGE
Start: 2022-06-30

## 2022-06-30 RX ORDER — ATORVASTATIN CALCIUM 20 MG/1
20 TABLET, FILM COATED ORAL DAILY
Qty: 90 TABLET | Refills: 1 | DISCHARGE
Start: 2022-06-30

## 2022-06-30 RX ORDER — LABETALOL 100 MG/1
100 TABLET, FILM COATED ORAL EVERY 12 HOURS SCHEDULED
Qty: 60 TABLET | Refills: 3 | DISCHARGE
Start: 2022-06-30

## 2022-06-30 RX ORDER — ATORVASTATIN CALCIUM 80 MG/1
80 TABLET, FILM COATED ORAL NIGHTLY
Qty: 30 TABLET | Refills: 3 | DISCHARGE
Start: 2022-06-30 | End: 2022-06-30 | Stop reason: HOSPADM

## 2022-06-30 RX ORDER — AMLODIPINE BESYLATE 5 MG/1
5 TABLET ORAL DAILY
Qty: 30 TABLET | Refills: 3 | DISCHARGE
Start: 2022-06-30

## 2022-06-30 RX ADMIN — OXCARBAZEPINE 300 MG: 300 TABLET, FILM COATED ORAL at 09:25

## 2022-06-30 RX ADMIN — CEFEPIME HYDROCHLORIDE 2000 MG: 2 INJECTION, POWDER, FOR SOLUTION INTRAVENOUS at 00:36

## 2022-06-30 RX ADMIN — ASPIRIN 81 MG: 81 TABLET, COATED ORAL at 09:26

## 2022-06-30 RX ADMIN — ENOXAPARIN SODIUM 40 MG: 100 INJECTION SUBCUTANEOUS at 09:26

## 2022-06-30 RX ADMIN — AMLODIPINE BESYLATE 5 MG: 5 TABLET ORAL at 09:26

## 2022-06-30 RX ADMIN — LABETALOL HYDROCHLORIDE 100 MG: 100 TABLET, FILM COATED ORAL at 09:26

## 2022-06-30 RX ADMIN — LAMOTRIGINE 50 MG: 25 TABLET ORAL at 09:25

## 2022-06-30 ASSESSMENT — PAIN SCALES - WONG BAKER
WONGBAKER_NUMERICALRESPONSE: 0

## 2022-06-30 ASSESSMENT — PAIN - FUNCTIONAL ASSESSMENT
PAIN_FUNCTIONAL_ASSESSMENT: ACTIVITIES ARE NOT PREVENTED
PAIN_FUNCTIONAL_ASSESSMENT: ACTIVITIES ARE NOT PREVENTED

## 2022-06-30 NOTE — PROGRESS NOTES
Speech Language/Pathology   SPEECH LANGUAGE AND CLINICAL BEDSIDE SWALLOWING TREATMENT  Speech Therapy Department       Krishna Garcia  AGE: 80 y.o. GENDER: female  : 1925  [id number]  EPISODE DATE:  2022    MEDICAL DIAGNOSIS: CVA  ONSET: 2022     Chief Complaint   Patient presents with    Extremity Weakness     Pt brought in by Ara Mancera from [facility] for right sided weakness that started yesterday. States pt has pneumonia and was previously treated. Went nonverbal yesterday morning and started with right sided weakness. PAST MEDICAL HISTORY      Diagnosis Date    Hematuria, microscopic      PAST SURGICAL HISTORY  Past Surgical History:   Procedure Laterality Date    COLONOSCOPY      Dr. Ariane Medina said it is not necessary to have anymore    HYSTERECTOMY (CERVIX STATUS UNKNOWN)      TONSILLECTOMY AND ADENOIDECTOMY       ALLERGIES  Allergies   Allergen Reactions    Penicillins      CHART REVIEW:  2022 admitted with c/o CVA  2022 Neuro consult noted     IMAGING:  CXR: 2022  Impression   Borderline cardiomegaly and minimal central pulmonary congestion or pulmonary   artery hypertension.       Mild increased interstitial markings throughout which appears chronic and   mild discoid atelectasis or scarring along the lung bases.  No obvious   infiltrate effusion is seen.      BRAIN MRI: 2022  Impression   1. Acute infarct within the medial left frontal lobe in the left anterior   cerebral artery territory. 2. No acute intracranial hemorrhage. 3. Mild diffuse cerebral volume loss and chronic small vessel ischemic   changes.    4. Moderate spinal canal stenosis at C1-C2 secondary to degenerative   overgrowth surrounding the dens.      Vision  Vision:  (macular degeneration; requires extra large print)  Hearing  Hearing:  (adequate for assessment needs)     Prior Status: resides at [facility]; recently transferred from assisted living to progressive care; received assistance for ADLs; dependent for IADLs, finance management, and med management; not an active ; completed high school; homemaker; enjoys [employer] and arts    Subjective:     Current diet: Regular/Thin  Pt/staff statements regarding diet tolerance: family reports decreased coughing with PO since last seen 6/27/2022; continued cough with PO reported, though    Cognitive-communication treatment progress: remains nonverbal; reported to follow basic dx and visually track per family    Objective: Current Therapy Impression of progress/goal status    IMPRESSIONS  Dysphagia Diagnosis:   Concern for moderate oral stage dysphagia and moderate-severe pharyngeal stage dysphagia characterized by decreased mastication, decreased lingula manipulation, delayed swallow, and decreased laryngeal elevation. Prolonged but adequate bolus formation and movement with all PO; concern for potentially excess effort/labor with regular solids - recommend soft/bite size solids as tolerated. No cough with PO trials this date, but concern for potential subtle/weak throat clear with all trials. Recommend continue soft/nectar with clinical monitor for tolerance; plan for dc to [facility] today - defer additional diet recs (upgrades vs downgrades to med team at [facility]). Dysphagia will be quite variable as patient's level of alertness appears to be quite variable.     SLP Diagnosis: Patient nonverbal and not following dx or responding to questions.  Appears to present with global aphasia with suspected underlying cognitive-language impairments that were reported as present at baseline with need for cues for sequencing basic tasks.     Recommended Diet:  Diet Solids Recommendation: Soft/Bite-size  Liquid Consistency Recommendation: Mildly/Nectar Thick  Meds: Crushed in puree/pudding  Compensatory Swallowing Strategies : Upright as possible for all oral intake,Remain upright for 30-45 minutes after meals,Eat/Feed slowly,Small bites/sips,Swallow 2 times per bite/sip    Status of Dysphagia Goals: The patient will tolerate ongoing clinical monitor/re-assessment for most optimal PO diet as tolerated. continue    Goal Status: Speech and Language Goals  Short-term Goals  Goal 1: Patient will vocalize 1/5 trials with max cues not met  Goal 2: Patient will demonstrate basic comprehension for picture/object/yes-no id with max cues not met    Prognosis  Dysphagia Prognosis: Guarded d/t reported baseline cough with and without PO  SLP Prognosis: Fair d/t severity of current imps    Education  Consulted and agree with results and recommendations: Patient,RN,Family members Mika Cloud and Lory Allen)  Patient Education Response: No evidence of learning      Discharge Recommendations:   Pt will benefit from continued skilled Speech Therapy for Speech and Dysphagia services    Please accept this as Speech Therapy Discharge status, if pt is discharged prior to next therapy session.     Objective: Assessment/Therapy activities    Treatment this session  Objective:  COMPREHENSION  Auditory Comprehension: []WFL []Mild   [] Moderate  [x]Severe  []To be assessed  Impaired Yes/no questions  Impaired Basic questions  Impaired One step commands  EXPRESSION  Verbal Expression: []WFL []Mild   [] Moderate  [x]Severe  []To be assessed  Impaired Initiation  Impaired Repetition  Impaired Automatic Speech  Impaired Confrontational Naming  Impaired Responsive Naming  Pragmatics/Social Functioning: []WFL []Mild   [x] Moderate  []Severe  []To be assessed  Impaired Eye contact  Flat Affect      MOTOR SPEECH  Motor Speech: []WFL []Mild   [] Moderate  []Severe  [x]To be assessed   []Apraxia   []Dysarthria   []Decreased Intelligibility    VOICE  Voice: []WFL []Mild   [] Moderate  []Severe  [x]To be assessed    COGNITION  [x]Unable to be assessed secondary to Aphasia   Overall Orientation : []WFL []Mild   [] Moderate  []Severe  []To be assessed   [x]Unable to be assessed secondary to Aphasia   Attention: []WFL []Mild   [] Moderate  []Severe  [x]To be assessed  Memory: []WFL []Mild   [] Moderate  []Severe  [x]To be assessed  Problem Solving: []WFL []Mild   [] Moderate  []Severe  [x]To be assessed  Safety/Judgement: []WFL []Mild   [] Moderate  []Severe  [x]To be assessed    DYSPHAGIA TREATMENT   Positioning: Fully upright in bed   PO Trials:  · Thin Liquids: reduced labial seal with anterior spillage occasionally; reduced bolus control; oral holding; suspect premature bolus loss to the pharynx; delayed swallow; decreased laryngeal elevation; subtle (barely audible) throat clear  · Nectar thick liquids: reduced bolus control; oral holding; suspect premature bolus loss to the pharynx; delayed swallow; decreased laryngeal elevation; subtle (barely audible) throat clear  · Honey Thick liquids: DNT  · Puree: reduced bolus control; oral holding; suspect premature bolus loss to the pharynx; delayed swallow; decreased laryngeal elevation; no overt signs/symptoms of penetration/aspiration   · Soft food: declined - patient reported to be gravitating to purees today  · Regular food: DNT  Patient/Family education: family at bedside; indicates understanding of current recs/rationale    Plan   Requires SLP Intervention: Yes  Therapeutic Interventions: Diet tolerance monitoring,Patient/Family education,Therapeutic PO trials with SLP, Aphasia rehab  Duration of Treatment: ST to tx 3-5 times per week during acute admission     Timed Code Treatment: 0  Total Treatment Time: 30  9:50 AM - 10:20 AM     Signed:  Gus Mireles, #[id number]  Speech-Language Pathologist  Portable phone: [phone]  06/30/22 10:20 AM

## 2022-06-30 NOTE — PROGRESS NOTES
RN called [facility] and gave report to nurse Lashay Wood. Discharge summary and [address] printed out, prepared to send for d/c.     Electronically signed by Ortiz Chavira RN on 6/30/2022 at 1:53 PM

## 2022-06-30 NOTE — CARE COORDINATION
CASE MANAGEMENT DISCHARGE SUMMARY:    DISCHARGE DATE: 6/30/22    DISCHARGED TO: Camron Clark  · Address:  [address] [place] [address]   · Phone:  [phone]  · Fax:  [phone]    TRANSPORTATION: [address]  [phone]             TIME: [address]: Confirmed with Macho SUMNER/JANELL COMPLETED: Completed, copy placed on chart    VIRGEN Suarez, [facility], Social Work/Case Management   [phone]  Electronically signed by VIRGEN Suarez, LSW on 6/30/2022 at 11:51 AM

## 2022-06-30 NOTE — PLAN OF CARE
Problem: Discharge Planning  Goal: Discharge to home or other facility with appropriate resources  Outcome: Progressing  Flowsheets (Taken 6/29/2022 2006)  Discharge to home or other facility with appropriate resources:   Identify barriers to discharge with patient and caregiver   Arrange for needed discharge resources and transportation as appropriate     Problem: Pain  Goal: Verbalizes/displays adequate comfort level or baseline comfort level  Outcome: Progressing  Flowsheets (Taken 6/30/2022 0618)  Verbalizes/displays adequate comfort level or baseline comfort level:   Encourage patient to monitor pain and request assistance   Assess pain using appropriate pain scale     Problem: Confusion  Goal: Confusion, delirium, dementia, or psychosis is improved or at baseline  Description: INTERVENTIONS:  1. Assess for possible contributors to thought disturbance, including medications, impaired vision or hearing, underlying metabolic abnormalities, dehydration, psychiatric diagnoses, and notify attending LIP  2. [place] high risk fall precautions, as indicated  3. Provide frequent short contacts to provide reality reorientation, refocusing and direction  4. Decrease environmental stimuli, including noise as appropriate  5. Monitor and intervene to maintain adequate nutrition, hydration, elimination, sleep and activity  6. If unable to ensure safety without constant attention obtain sitter and review sitter guidelines with assigned personnel  7.  Initiate Psychosocial CNS and Spiritual Care consult, as indicated  Outcome: Progressing  Flowsheets (Taken 6/30/2022 0618)  Effect of thought disturbance (confusion, delirium, dementia, or psychosis) are managed with adequate functional status:   Assess for contributors to thought disturbance, including medications, impaired vision or hearing, underlying metabolic abnormalities, dehydration, psychiatric diagnoses, notify [place] high risk fall precautions, as assessment

## 2022-06-30 NOTE — PROGRESS NOTES
[facility] Progress Note  6/30/2022 8:19 AM  Subjective:   Admit Date: 6/26/2022      Chief Complaint: Non verbal    Interval History: Had fever yest to 101--relief with tylemol  CXR--no infilt  U/a--neg       Diet: ADULT DIET; Dysphagia - Soft and Bite Sized; Mildly Thick (Nectar)  Medications:   Scheduled Meds:   lamoTRIgine  100 mg Oral Nightly    enoxaparin  40 mg SubCUTAneous Daily    aspirin  81 mg Oral Daily    Or    aspirin  300 mg Rectal Daily    atorvastatin  80 mg Oral Nightly    amLODIPine  5 mg Oral Daily    labetalol  100 mg Oral 2 times per day    lamoTRIgine  50 mg Oral Daily    OXcarbazepine  300 mg Oral BID    cefepime  2,000 mg IntraVENous Q12H     Continuous Infusions:    Review of Systems -   General ROS: fever  Respiratory ROS: no cough, shortness of breath or wheezing  Cardiovascular ROS: no chest pain  Musculoskeletal ROS:positive for - :joint pain  Neurological ROS: positive for - speech problems    Objective:   Vitals: BP (!) 137/57   Pulse 79   Temp 99.4 °F (37.4 °C) (Axillary)   Resp 19   Ht 5' 1\" (1.549 m)   Wt 139 lb 1.8 oz (63.1 kg)   SpO2 94%   BMI 26.28 kg/m²   General appearance: alert and cooperative with exam  HEENT: Head: Normocephalic, no lesions, without obvious abnormality.   Neck: no adenopathy, no carotid bruit, no JVD, supple, symmetrical, trachea midline and thyroid not enlarged, symmetric, no tenderness/mass/nodules  Lungs: clear to auscultation bilaterally  Heart: regular rate and rhythm, S1, S2 normal, no murmur, click, rub or gallop  Abdomen: soft, non-tender; bowel sounds normal; no masses,  no organomegaly  Extremities: extremities normal, atraumatic, no cyanosis or edema  Neurologic: Mental status: exp aphasia---rt body weakness     Admission on 06/26/2022   Component Date Value Ref Range Status    WBC 06/26/2022 6.6  4.0 - 11.0 K/uL Final    RBC 06/26/2022 4.02  4.00 - 5.20 M/uL Final    Hemoglobin 06/26/2022 12.8  12.0 - 16.0 g/dL Final    Hematocrit 06/26/2022 38.1  36.0 - 48.0 % Final    MCV 06/26/2022 94.9  80.0 - 100.0 fL Final    MCH 06/26/2022 32.0  26.0 - 34.0 pg Final    MCHC 06/26/2022 33.7  31.0 - 36.0 g/dL Final    RDW 06/26/2022 13.1  12.4 - 15.4 % Final    Platelets 13/98/3584 269  135 - 450 K/uL Final    MPV 06/26/2022 6.9  5.0 - 10.5 fL Final    Neutrophils % 06/26/2022 67.0  % Final    Lymphocytes % 06/26/2022 16.7  % Final    Monocytes % 06/26/2022 14.0  % Final    Eosinophils % 06/26/2022 1.6  % Final    Basophils % 06/26/2022 0.7  % Final    Neutrophils Absolute 06/26/2022 4.4  1.7 - 7.7 K/uL Final    Lymphocytes Absolute 06/26/2022 1.1  1.0 - 5.1 K/uL Final    Monocytes Absolute 06/26/2022 0.9  0.0 - 1.3 K/uL Final    Eosinophils Absolute 06/26/2022 0.1  0.0 - 0.6 K/uL Final    Basophils Absolute 06/26/2022 0.0  0.0 - 0.2 K/uL Final    Sodium 06/26/2022 132* 136 - 145 mmol/L Final    Potassium 06/26/2022 4.5  3.5 - 5.1 mmol/L Final    Chloride 06/26/2022 97* 99 - 110 mmol/L Final    CO2 06/26/2022 27  21 - 32 mmol/L Final    Anion Gap 06/26/2022 8  3 - 16 Final    Glucose 06/26/2022 106* 70 - 99 mg/dL Final    BUN 06/26/2022 12  7 - 20 mg/dL Final    CREATININE 06/26/2022 0.6  0.6 - 1.2 mg/dL Final    GFR Non- 06/26/2022 >60  >60 Final    Comment: >60 mL/min/1.73m2 EGFR, calc. for ages 25 and older using the  MDRD formula (not corrected for weight), is valid for stable  renal function.  GFR  06/26/2022 >60  >60 Final    Comment: Chronic Kidney Disease: less than 60 ml/min/1.73 sq.m. Kidney Failure: less than 15 ml/min/1.73 sq.m. Results valid for patients 18 years and older.       Calcium 06/26/2022 8.6  8.3 - 10.6 mg/dL Final    Total Protein 06/26/2022 6.1* 6.4 - 8.2 g/dL Final    Albumin 06/26/2022 3.6  3.4 - 5.0 g/dL Final    Albumin/Globulin Ratio 06/26/2022 1.4  1.1 - 2.2 Final    Total Bilirubin 06/26/2022 0.3  0.0 - 1.0 mg/dL Final    Alkaline mg/dL Final    Bilirubin Urine 06/26/2022 Negative  Negative Final    Ketones, Urine 06/26/2022 Negative  Negative mg/dL Final    Specific [place], UA 06/26/2022 1.013  1.005 - 1.030 Final    Blood, Urine 06/26/2022 Negative  Negative Final    pH, UA 06/26/2022 5.0  5.0 - 8.0 Final    Protein, UA 06/26/2022 Negative  Negative mg/dL Final    Urobilinogen, Urine 06/26/2022 0.2  <2.0 E.U./dL Final    Nitrite, Urine 06/26/2022 Negative  Negative Final    Leukocyte Esterase, Urine 06/26/2022 Negative  Negative Final    Microscopic Examination 06/26/2022 Not Indicated   Final    Urine Type 06/26/2022 Other   Final    Urine Reflex to Culture 06/26/2022 Not Indicated   Final    Ventricular Rate 06/26/2022 87  BPM Final    Atrial Rate 06/26/2022 87  BPM Final    P-R Interval 06/26/2022 162  ms Final    QRS Duration 06/26/2022 78  ms Final    Q-T Interval 06/26/2022 370  ms Final    QTc Calculation (Bazett) 06/26/2022 445  ms Final    P Axis 06/26/2022 35  degrees Final    R Axis 06/26/2022 0  degrees Final    T Axis 06/26/2022 18  degrees Final    Diagnosis 06/26/2022 Normal sinus rhythmPoor R wave progressionAbnormal ECGWhen compared with ECG of 16-JUN-2019 06:56,No significant change was foundConfirmed by YANDY RAMOS MD ([id number]) on 6/27/2022 11:25:40 AM   Final    WBC 06/27/2022 8.4  4.0 - 11.0 K/uL Final    RBC 06/27/2022 3.93* 4.00 - 5.20 M/uL Final    Hemoglobin 06/27/2022 12.7  12.0 - 16.0 g/dL Final    Hematocrit 06/27/2022 37.2  36.0 - 48.0 % Final    MCV 06/27/2022 94.7  80.0 - 100.0 fL Final    MCH 06/27/2022 32.3  26.0 - 34.0 pg Final    MCHC 06/27/2022 34.1  31.0 - 36.0 g/dL Final    RDW 06/27/2022 12.7  12.4 - 15.4 % Final    Platelets 32/13/9534 281  135 - 450 K/uL Final    MPV 06/27/2022 6.7  5.0 - 10.5 fL Final    Hemoglobin A1C 06/27/2022 5.4  See comment % Final    Comment: Comment:  Diagnosis of Diabetes: > or = 6.5%  Increased risk of diabetes (Prediabetes): 5.7-6.4%  Glycemic Control: Nonpregnant Adults: <7.0%                    Pregnant: <6.0%        eAG 06/27/2022 108.3  mg/dL Final    Cholesterol, Total 06/27/2022 174  0 - 199 mg/dL Final    Triglycerides 06/27/2022 119  0 - 150 mg/dL Final    HDL 06/27/2022 38* 40 - 60 mg/dL Final    LDL Calculated 06/27/2022 112* <100 mg/dL Final    VLDL Cholesterol Calculated 06/27/2022 24  Not Established mg/dL Final    Sodium 06/27/2022 137  136 - 145 mmol/L Final    Potassium reflex Magnesium 06/27/2022 4.6  3.5 - 5.1 mmol/L Final    Chloride 06/27/2022 102  99 - 110 mmol/L Final    CO2 06/27/2022 25  21 - 32 mmol/L Final    Anion Gap 06/27/2022 10  3 - 16 Final    Glucose 06/27/2022 108* 70 - 99 mg/dL Final    BUN 06/27/2022 13  7 - 20 mg/dL Final    CREATININE 06/27/2022 0.5* 0.6 - 1.2 mg/dL Final    GFR Non- 06/27/2022 >60  >60 Final    Comment: >60 mL/min/1.73m2 EGFR, calc. for ages 25 and older using the  MDRD formula (not corrected for weight), is valid for stable  renal function.  GFR  06/27/2022 >60  >60 Final    Comment: Chronic Kidney Disease: less than 60 ml/min/1.73 sq.m. Kidney Failure: less than 15 ml/min/1.73 sq.m. Results valid for patients 18 years and older.  Calcium 06/27/2022 8.7  8.3 - 10.6 mg/dL Final    Lamotrigine Lvl 06/27/2022 1.9* 3.0 - 15.0 ug/mL Final    Comment: INTERPRETIVE INFORMATION:  Lamotrigine  Therapeutic Range:  3.0-15.0 ug/mL              Toxic:  Greater than or equal to 20 ug/mL  Pharmacokinetics varies widely, particularly with co-medications and/or  compromised renal function. Adverse effects may include dizziness,  somnolence, nausea and vomiting. Performed By: Ousmane Newton [address]  [place], [address] [facility]  : Anjelica Marquez.  Braulio Rhodes MD      WBC 06/28/2022 8.0  4.0 - 11.0 K/uL Final    RBC 06/28/2022 3.85* 4.00 - 5.20 M/uL Final    Hemoglobin 06/28/2022 12.5  12.0 - 16.0 g/dL Final    Hematocrit 06/28/2022 36.2  36.0 - 48.0 % Final    MCV 06/28/2022 94.2  80.0 - 100.0 fL Final    MCH 06/28/2022 32.6  26.0 - 34.0 pg Final    MCHC 06/28/2022 34.6  31.0 - 36.0 g/dL Final    RDW 06/28/2022 12.9  12.4 - 15.4 % Final    Platelets 13/71/4074 284  135 - 450 K/uL Final    MPV 06/28/2022 6.7  5.0 - 10.5 fL Final    Neutrophils % 06/28/2022 68.8  % Final    Lymphocytes % 06/28/2022 18.1  % Final    Monocytes % 06/28/2022 9.8  % Final    Eosinophils % 06/28/2022 2.1  % Final    Basophils % 06/28/2022 1.2  % Final    Neutrophils Absolute 06/28/2022 5.5  1.7 - 7.7 K/uL Final    Lymphocytes Absolute 06/28/2022 1.5  1.0 - 5.1 K/uL Final    Monocytes Absolute 06/28/2022 0.8  0.0 - 1.3 K/uL Final    Eosinophils Absolute 06/28/2022 0.2  0.0 - 0.6 K/uL Final    Basophils Absolute 06/28/2022 0.1  0.0 - 0.2 K/uL Final    Sodium 06/28/2022 132* 136 - 145 mmol/L Final    Potassium 06/28/2022 4.3  3.5 - 5.1 mmol/L Final    Chloride 06/28/2022 98* 99 - 110 mmol/L Final    CO2 06/28/2022 21  21 - 32 mmol/L Final    Anion Gap 06/28/2022 13  3 - 16 Final    Glucose 06/28/2022 117* 70 - 99 mg/dL Final    BUN 06/28/2022 16  7 - 20 mg/dL Final    CREATININE 06/28/2022 <0.5* 0.6 - 1.2 mg/dL Final    GFR Non- 06/28/2022 >60  >60 Final    Comment: >60 mL/min/1.73m2 EGFR, calc. for ages 25 and older using the  MDRD formula (not corrected for weight), is valid for stable  renal function.  GFR  06/28/2022 >60  >60 Final    Comment: Chronic Kidney Disease: less than 60 ml/min/1.73 sq.m. Kidney Failure: less than 15 ml/min/1.73 sq.m. Results valid for patients 18 years and older.       Calcium 06/28/2022 8.4  8.3 - 10.6 mg/dL Final    Total Protein 06/28/2022 6.1* 6.4 - 8.2 g/dL Final    Albumin 06/28/2022 3.3* 3.4 - 5.0 g/dL Final    Albumin/Globulin Ratio 06/28/2022 1.2  1.1 - 2.2 Final    Total Bilirubin 06/28/2022 0.4  0.0 - 1.0 mg/dL Final    Alkaline Phosphatase 06/28/2022 59  40 - 129 U/L Final    ALT 06/28/2022 8* 10 - 40 U/L Final    AST 06/28/2022 20  15 - 37 U/L Final    WBC 06/29/2022 9.4  4.0 - 11.0 K/uL Final    RBC 06/29/2022 3.91* 4.00 - 5.20 M/uL Final    Hemoglobin 06/29/2022 12.8  12.0 - 16.0 g/dL Final    Hematocrit 06/29/2022 37.2  36.0 - 48.0 % Final    MCV 06/29/2022 95.3  80.0 - 100.0 fL Final    MCH 06/29/2022 32.8  26.0 - 34.0 pg Final    MCHC 06/29/2022 34.4  31.0 - 36.0 g/dL Final    RDW 06/29/2022 12.9  12.4 - 15.4 % Final    Platelets 68/59/8649 242  135 - 450 K/uL Final    MPV 06/29/2022 6.6  5.0 - 10.5 fL Final    Neutrophils % 06/29/2022 74.4  % Final    Lymphocytes % 06/29/2022 12.8  % Final    Monocytes % 06/29/2022 10.6  % Final    Eosinophils % 06/29/2022 1.5  % Final    Basophils % 06/29/2022 0.7  % Final    Neutrophils Absolute 06/29/2022 7.0  1.7 - 7.7 K/uL Final    Lymphocytes Absolute 06/29/2022 1.2  1.0 - 5.1 K/uL Final    Monocytes Absolute 06/29/2022 1.0  0.0 - 1.3 K/uL Final    Eosinophils Absolute 06/29/2022 0.1  0.0 - 0.6 K/uL Final    Basophils Absolute 06/29/2022 0.1  0.0 - 0.2 K/uL Final    Sodium 06/29/2022 134* 136 - 145 mmol/L Final    Potassium 06/29/2022 4.2  3.5 - 5.1 mmol/L Final    Chloride 06/29/2022 99  99 - 110 mmol/L Final    CO2 06/29/2022 24  21 - 32 mmol/L Final    Anion Gap 06/29/2022 11  3 - 16 Final    Glucose 06/29/2022 93  70 - 99 mg/dL Final    BUN 06/29/2022 13  7 - 20 mg/dL Final    CREATININE 06/29/2022 0.5* 0.6 - 1.2 mg/dL Final    GFR Non- 06/29/2022 >60  >60 Final    Comment: >60 mL/min/1.73m2 EGFR, calc. for ages 25 and older using the  MDRD formula (not corrected for weight), is valid for stable  renal function.  GFR  06/29/2022 >60  >60 Final    Comment: Chronic Kidney Disease: less than 60 ml/min/1.73 sq.m. Kidney Failure: less than 15 ml/min/1.73 sq.m. Results valid for patients 18 years and older.       Calcium 06/29/2022 8.5  8.3 - 10.6 mg/dL Final    Sodium 06/30/2022 138  136 - 145 mmol/L Final    Potassium 06/30/2022 4.0  3.5 - 5.1 mmol/L Final    Chloride 06/30/2022 103  99 - 110 mmol/L Final    CO2 06/30/2022 25  21 - 32 mmol/L Final    Anion Gap 06/30/2022 10  3 - 16 Final    Glucose 06/30/2022 96  70 - 99 mg/dL Final    BUN 06/30/2022 17  7 - 20 mg/dL Final    CREATININE 06/30/2022 <0.5* 0.6 - 1.2 mg/dL Final    GFR Non- 06/30/2022 >60  >60 Final    Comment: >60 mL/min/1.73m2 EGFR, calc. for ages 25 and older using the  MDRD formula (not corrected for weight), is valid for stable  renal function.  GFR  06/30/2022 >60  >60 Final    Comment: Chronic Kidney Disease: less than 60 ml/min/1.73 sq.m. Kidney Failure: less than 15 ml/min/1.73 sq.m. Results valid for patients 18 years and older.  Calcium 06/30/2022 8.3  8.3 - 10.6 mg/dL Final         Assessment & Plan:   Principal Problem:    Ischemic stroke (HCC)--cont asa/statin  Active Problems:    Hypertension--stable     Expressive aphasia--speech tx following    Hyperlipidemia  Resolved Problems:    * No resolved hospital problems.  *  dw meagan x 2 at bedside--ret to w/p--skilled unit today   DS dictated   Please note that over 35 minutes was spent in evaluating the patient, review of records and results, discussion with staff/family, etc.    Minoo Acosta MD

## 2022-06-30 NOTE — DISCHARGE INSTR - COC
Continuity of Care Form    Patient Name: Juan Antonio Talbot   :  1925  MRN:  [id number]    Admit date:  2022  Discharge date:  2022    Code Status Order: DNR-CC   Advance Directives:      Admitting Physician:  Aakash Rubin MD  PCP: Zak Courtney MD    Discharging Nurse: Julia [facility] Unit/Room#: O4W-7309/5042-94  Discharging Unit Phone Number: [phone]    Emergency Contact:   Extended Emergency Contact Information  Primary Emergency Contact: Malik Krueger, [address] [facility] Phone: [phone]  Mobile Phone: [phone]  Relation: Child  Secondary Emergency Contact: [facility] Phone: [phone]  Relation: Child    Past Surgical History:  Past Surgical History:   Procedure Laterality Date    COLONOSCOPY  2006    Dr. Rashaun Villa said it is not necessary to have anymore    HYSTERECTOMY (CERVIX STATUS UNKNOWN)      TONSILLECTOMY AND ADENOIDECTOMY         Immunization History:   Immunization History   Administered Date(s) Administered    COVID-19, PFIZER PURPLE top, DILUTE for use, (age 15 y+), 30mcg/0.3mL 2020, 2021, 2021    Influenza 10/22/2013    Influenza Virus Vaccine 10/11/2012, 2014, 10/02/2015    Influenza Whole 10/27/2011    Influenza, High Dose (Fluzone 65 yrs and older) 10/13/2017, 10/05/2019    Influenza, Quadv, IM, PF (6 mo and older Fluzone, Flulaval, Fluarix, and 3 yrs and older Afluria) 10/04/2016    Pneumococcal Conjugate 13-valent (Pk Neth) 2016    Tdap (Boostrix, Adacel) 1994    Zoster Live (Zostavax) 2009       Active Problems:  Patient Active Problem List   Diagnosis Code    Carotid bruit R09.89    Tear of lateral cartilage or meniscus of knee, current S83.289A    Hypertension I10    Depression F32. A    Allergic rhinitis J30.9    Hyperlipidemia E78.5    Abdominal pain R10.9    Headache R51.9    Abnormal glucose R73.09    Ischemic stroke (HCC) I63.9    Expressive aphasia R47.01 Anxiety F41.9       Isolation/Infection:   Isolation            No Isolation          Patient Infection Status       None to display            Nurse Assessment:  Last Vital Signs: BP (!) 137/57   Pulse 79   Temp 99.4 °F (37.4 °C) (Axillary)   Resp 19   Ht 5' 1\" (1.549 m)   Wt 139 lb 1.8 oz (63.1 kg)   SpO2 94%   BMI 26.28 kg/m²     Last documented pain score (0-10 scale):    Last Weight:   Wt Readings from Last 1 Encounters:   06/30/22 139 lb 1.8 oz (63.1 kg)     Mental Status:  oriented and to person only    IV Access:  - None    Nursing Mobility/ADLs:  Walking   Dependent  Transfer  Dependent  Bathing  Dependent  Dressing  Dependent  Toileting  Dependent  Feeding  Dependent  Med Admin  Dependent  Med Delivery   crushed    Wound Care Documentation and Therapy:        Elimination:  Continence: Bowel: No  Bladder: No  Urinary Catheter: None   Colostomy/Ileostomy/Ileal Conduit: No       Date of Last BM: 06/28/2022    Intake/Output Summary (Last 24 hours) at 6/30/2022 0836  Last data filed at 6/30/2022 0628  Gross per 24 hour   Intake 120 ml   Output 1400 ml   Net -1280 ml     I/O last 3 completed shifts: In: 249.1 [P.O.:150; IV Piggyback:99.1]  Out: 2400 [Urine:2400]    Safety Concerns:     Aspiration Risk    Impairments/Disabilities:      Speech, Vision, and Paralysis - Right side flaccid    Nutrition Therapy:  Current Nutrition Therapy:   - Oral Diet:  Dental Soft and Dysphagia 2 mechanically altered    Routes of Feeding: Oral  Liquids: Nectar Thick Liquids  Daily Fluid Restriction: no  Last Modified Barium Swallow with Video (Video Swallowing Test): not done    Treatments at the Time of Hospital Discharge:   Respiratory Treatments: n/a  Oxygen Therapy:  is not on home oxygen therapy.   Ventilator:    - No ventilator support    Rehab Therapies: Physical Therapy, Occupational Therapy, and Speech/Language Therapy  Weight Bearing Status/Restrictions: Non-weight bearing on right leg  Other Medical Equipment (for information only, NOT a DME order):  n/a  Other Treatments: n/a    Patient's personal belongings (please select all that are sent with patient):  None    RN SIGNATURE:  Electronically signed by Ann Sen RN on 6/30/22 at 12:01 PM EDT    CASE MANAGEMENT/SOCIAL WORK SECTION    Inpatient Status Date: 6/26/22    Readmission Risk Assessment Score:  Readmission Risk              Risk of Unplanned Readmission:  8           Discharging to Facility/ Agency   Name: [address] [place] [address]  Address:  [address] [place] [address]   Phone:  [phone]  Fax:  [phone]      Dialysis Facility (if applicable)   Name:  Address:  Dialysis Schedule:  Phone:  Fax:    / signature: Electronically signed by VIRGEN Morillo LSW on 6/30/22 at 11:52 AM EDT    PHYSICIAN SECTION    Prognosis: Guarded    Condition at Discharge: Stable    Rehab Potential (if transferring to Rehab): Fair    Recommended Labs or Other Treatments After Discharge: pt/ot/speech tx     Physician Certification: I certify the above information and transfer of Ramona Ray  is necessary for the continuing treatment of the diagnosis listed and that she requires Merged with [facility] for greater 30 days.      Update Admission H&P: No change in H&P    PHYSICIAN SIGNATURE:  Electronically signed by Shanelle Flores MD on 6/30/22 at 8:37 AM EDT

## 2022-06-30 NOTE — PROGRESS NOTES
Discharge orders acknowledged by RN . Discharge teaching completed with pt and family. AVS reviewed and all questions answered. Medication regimen reviewed. Follow up appointments also reviewed with pt and resources given for discharge. IV removed. Bedside monitor removed from pt. 60+ minutes of education completed. Pt vitals WDL. Pt discharged with all belongings to [facility]. Pt transported off of unit via stretcher. No complications.     Electronically signed by Mala Jones RN on 6/30/2022 at 2:09 PM

## 2022-06-30 NOTE — DISCHARGE SUMMARY
[address] [place] [address]                               DISCHARGE SUMMARY    PATIENT NAME: Brittani Cristina                :        1925  MED REC NO:   [id number]                          ROOM:       5274  ACCOUNT NO:   [de-identified]                           ADMIT DATE: 2022  PROVIDER:     Ifrah Good MD                  DISCHARGE DATE:  2022    DIAGNOSIS ON ADMISSION:  Acute CVA. DIAGNOSES ON DISCHARGE:  1.  Ischemic stroke. 2.  Expressive aphasia. 3.  Hypertension. 4.  Hyperlipidemia. 5.  Anxiety. HISTORY OF PRESENT ILLNESS:  The patient is a 63-year-old white female  admitted through emergency. She presented with a history of a  right-sided weakness and inability to speak over the past 24 hours while  at [facility]. She had had a fall about a week prior to that, also had  some confusion earlier in the week, was diagnosed with urinary tract  infection, was on antibiotics. Family noticed a day or two prior to  admission, the patient was not speaking and had some right-sided  weakness. Evaluation in the emergency showed a right-sided paresis and  expressive aphasia. Head CT did not show a stroke or a bleed. She was  admitted. Past medical history is remarkable for hematuria, bipolar  disorder for which she takes Lamictal.  Please see the HPI for further  details. She was admitted. She was placed on aspirin. Some of her  maintenance meds were restarted. An MRI of the brain was performed  following admission and it did show acute infarct in the medial left  frontal lobe in a left anterior cerebral artery distribution. Multiple  foci of abnormal increased flare intensity in both hemispheres. There  was no intracranial hemorrhage. No mass effect or midline shift.   She  was noted also to have mild diffuse cerebral volume loss and chronic  small vessel ischemic disease, also moderate spinal canal stenosis of  the cervical spine. She was seen by PT and OT that did assist some. She did remain with a fairly dense hemiparesis. She would move her  right leg to withdraw, did not move her left arm substantially. She  remained with expressive aphasia although she was able to recognize  people and attempt to communicate. Anxiety was also a part of her  picture and Xanax was restarted on a smaller dose to assist with  sleeping and participation in therapies. Clinically, she has improved  some. She has remained stable and is being readied for transfer back to  [facility] today. BUN and creatinine are 17 and 0.5, sodium 138,  potassium 4.0. Urinalysis was unremarkable. Chest x-ray did not show  an acute infiltrate. She was placed on broad-spectrum antibiotics  empirically. Those will be discontinued at the time of discharge. She  has stabilized some, remains with a fairly dense paresis and expressive  aphasia. Dr. Polo Allan did discuss with the family and she will remain a  [facility]. She will return to [facility] for skilled care on 06/30/2022. She will be followed there by Dr. Polo Allan and her discharge meds will  include aspirin 81 mg daily, atorvastatin 80 mg at bedtime. Other meds  will include alprazolam 0.25 mg b.i.d., amlodipine 5 mg daily, labetalol  100 mg every 12 hours, Trileptal 300 mg twice daily. Maintenance meds  include Lamictal 100 mg at night time, 50 mg in the morning, and Patanol  eye drops. She will be taken off her Zyprexa as she seems fairly  sedated, not requiring medicines for agitation. She will be followed by  Dr. Polo Allan at [facility]. Remains a full no code.     Jourdan Vargas MD    D: 06/30/2022 8:53:19       T: 06/30/2022 14:30:18     [email or web address]  Job#: [id number]     Doc#: [id number]    CC:  Lacho Palm MD       [facility]

## 2022-06-30 NOTE — PLAN OF CARE
Problem: Discharge Planning  Goal: Discharge to home or other facility with appropriate resources  6/30/2022 1144 by Zain Guerra RN  Outcome: Completed     Problem: Pain  Goal: Verbalizes/displays adequate comfort level or baseline comfort level  6/30/2022 1144 by SARMAD Salinas RN  Outcome: Completed     Problem: Confusion  Goal: Confusion, delirium, dementia, or psychosis is improved or at baseline  Description: INTERVENTIONS:  1. Assess for possible contributors to thought disturbance, including medications, impaired vision or hearing, underlying metabolic abnormalities, dehydration, psychiatric diagnoses, and notify attending LIP  2. [place] high risk fall precautions, as indicated  3. Provide frequent short contacts to provide reality reorientation, refocusing and direction  4. Decrease environmental stimuli, including noise as appropriate  5. Monitor and intervene to maintain adequate nutrition, hydration, elimination, sleep and activity  6. If unable to ensure safety without constant attention obtain sitter and review sitter guidelines with assigned personnel  7. Initiate Psychosocial CNS and Spiritual Care consult, as indicated  6/30/2022 1144 by Zain Guerra RN  Outcome: Completed     Problem: Skin/Tissue Integrity  Goal: Absence of new skin breakdown  Description: 1. Monitor for areas of redness and/or skin breakdown  2. Assess vascular access sites hourly  3. Every 4-6 hours minimum:  Change oxygen saturation probe site  4. Every 4-6 hours:  If on nasal continuous positive airway pressure, respiratory therapy assess nares and determine need for appliance change or resting period.   6/30/2022 1144 by Zain Guerra RN  Outcome: Completed     Problem: Safety - Adult  Goal: Free from fall injury  6/30/2022 1144 by Zain Guerra RN  Outcome: Completed     Problem: Chronic Conditions and Co-morbidities  Goal: Patient's chronic conditions and co-morbidity symptoms are monitored and maintained or improved  6/30/2022 1144 by Salvatore Denson RN  Outcome: Completed     Problem: ABCDS Injury Assessment  Goal: Absence of physical injury  6/30/2022 1144 by Salvatore Denson RN  Outcome: Completed

## 2022-06-30 NOTE — CARE COORDINATION
Discharge Planning:   Discharge order acknowledged. Transportation arranged at 1400 via [place] [employer]. Voicemail left for [place] at [facility] notifying of discharge and tentative transport. Voicemail left for daughter Sumanht Goes regarding discharge, transport, and IMM Letter. Discharging to Facility/ Agency   · Name: [facility]  · Address:  [address] [place] [address], [facility]   · Phone:  [phone]  · Fax:  [phone]     NEED: to confirm if patient will be skilled, patient will need HENS   COVID test completed on 4/22/22    VIRGEN Shah, MIKAEL, Social Work/Case Management   [phone]  Electronically signed by VIRGEN Shah LSW on 6/30/2022 at 9:03 AM      Spoke to [place] at [facility] confirmed patient has obtained precert and can come today. Patient will need new COVID test, Rapid or PCR. Notified of transport time. Met with patient and daughter, Lesley Sanchez at bedside. Discussed discharge plan. Confirmed plan is for patient to return to [facility]. Notified of transport time. Provided IMM Letter.      Spoke to [employer], RN, requested COVID test.   Electronically signed by VIRGEN Shah LSW on 6/30/2022 at 10:20 AM

## 2022-07-20 ENCOUNTER — OUTSIDE SERVICES (OUTPATIENT)
Dept: WOUND CARE | Age: 87
End: 2022-07-20
Payer: COMMERCIAL

## 2022-07-20 DIAGNOSIS — T14.8XXA DEEP TISSUE INJURY: ICD-10-CM

## 2022-07-20 DIAGNOSIS — L89.150 PRESSURE INJURY OF COCCYGEAL REGION, UNSTAGEABLE (HCC): Primary | ICD-10-CM

## 2022-07-20 PROCEDURE — 97597 DBRDMT OPN WND 1ST 20 CM/<: CPT | Performed by: CLINICAL NURSE SPECIALIST

## 2022-07-20 NOTE — PROGRESS NOTES
[address] [facility]    Patient name: Cinthia Dhaliwal  :   0-  Facility:  [address] [place] [address] Service: skilled nursing facility (31)    Primary diagnosis for wound-care consultation: Wound to coccyx    Additional ulcer(s) noted? None    History of Present Illness: Initial visit. Patient here for rehab. CVA with right hemiparesis and aphasia. Recent pneumonia. Recent UTI. OT PT and ST consulted. Unstageable pressure injury within DTI to coccyx. Debrided today of fibrin and biofilm. Appetite fair to good. Protein supplements are provided. Patient is offloading. Low-air-loss mattress in place. Side to side when in bed as much as possible. Pressure redistribution chair cushion. In chair no more than 2 to 3 hours at a time if possible. No signs of wound infection. No fever or chills. Review of Systems: Pertinent systems reviewed in the HPI; all other systems reviewed, and negative.     Pertinent elements of past medical, surgical, family, and/or social history: Cerebral infarction, cognitive communication deficit, flaccid hemiplegia affecting right dominant side, such hypertension, anxiety disorder, bipolar 2 disorder, mixed receptive-expressive language disorder, muscle wasting and atrophy and unspecified dementia without behavioral disturbance    Medications and allergies are detailed in the nursing home chart, and were reviewed by me today.  _______________________    General Physical exam:    Vital signs:  123/76, 97.1, 75, 18, 92%    General Appearance: alert, well developed, expressive aphasia, in no acute distress  Psychiatric:  Mood and affect appropriate for situation  Skin: warm and dry, no rash  Head: normocephalic and atraumatic  Eyes: pupils equal, round, sclerae anicteric, conjunctivae normal  ENT: no thrush or oral ulcers  Neck:No complaints, normal appearance  Pulmonary/Chest: Respirations easy at rest, no cough or respiratory distress  Cardiovascular: No chest pain, normal rate, toes warm, cap refill normal  Abdomen: No nausea or vomiting  Extremities: no cyanosis, edema or cellulitis  Musculoskeletal: Nonambulatory, right hemiparesis   Neurologic: distal sensation to light touch intact, no allodynia. Wound exam:    Wound location: Coccyx   Length (cm) 4.5   Width (cm) 2   Depth (cm) 0.1   Tunneling 0   Undermining 0    Wound type:   Pressure  Grade - stage - thickness: Unstageable pressure injury within DTI     Description of periwound: Intact    Description of wound bed: Wound with red granulation and fibrin within DTI, purple discoloration. Wound bed moist, moderate amount of serous drainage, edges with fibrin, irregular and attached. Surrounding tissue and ulcer without signs and symptoms of infection. No purulence, malodor, erythema, increased temperature, or increased pain.   _______________________    Recent labs and data reviewed: 6/25/2022: Glucose 107, BUN 13, creatinine 0.58, GFR 82, hemoglobin/hematocrit: 12/38.  6/2022: UTI treated with Cipro  _______________________     Cherene Minus diagnoses & assessment: Unstageable pressure injury within DTI to coccyx. Purple discoloration superior to wound. Entire area included in measurement. Debrided today of fibrin and biofilm. Appetite fair to good. Protein supplements are provided. Patient is offloading. Low-air-loss mattress in place. Side to side when in bed as much as possible. Pressure redistribution chair cushion. In chair no more than 2 to 3 hours at a time if possible. No signs of wound infection. Debridement is  indicated today, based on the history and exam above.  _______________________    Procedure:    Consent obtained. Time out performed per [facility]. Topical anesthetic applied: 5% topical lidocaine. Using a curette, I sharply debrided the coccyx ulcer(s) down through and including the removal of epidermis and dermis.  The type(s) of tissue debrided included fibrin and biofilm. Total Surface Area Debrided: 9 sq cm. The ulcers were then irrigated with normal saline solution. The procedure was completed with a small amount of bleeding, and hemostasis was by pressure. The patient tolerated the procedure well, with no significant complications. The patient's level of pain during and after the procedure was monitored. If post-debridement measurements are significantly different from initial measurements, those will be noted here.   _______________________    Recommendations:    - Dressings / Compression / Offloading: Triad, alginate AG and hydrocolloid. Side to side as much as possible when in bed. Pressure redistribution chair cushion. In chair no more than 2 to 3 hours at a time if possible. Low-air-loss mattress.     - Labs / Diagnostic studies: None    - Medications / nutritional support: ProMod daily, Ensure daily    - Further Consultations recommended: Working with OT, PT and ST    - Anticipated follow-up: Weekly evaluation  _______________________    Electronically signed by EAMON Chavez CNP on 7/20/2022 at 5:09 PM

## 2022-08-03 ENCOUNTER — OUTSIDE SERVICES (OUTPATIENT)
Dept: WOUND CARE | Age: 87
End: 2022-08-03
Payer: COMMERCIAL

## 2022-08-03 DIAGNOSIS — L89.150 PRESSURE INJURY OF COCCYGEAL REGION, UNSTAGEABLE (HCC): Primary | ICD-10-CM

## 2022-08-03 PROCEDURE — 97597 DBRDMT OPN WND 1ST 20 CM/<: CPT | Performed by: CLINICAL NURSE SPECIALIST

## 2022-08-05 NOTE — PROGRESS NOTES
[address] [facility]    Patient name: Castro Blackwell  :   1-  Facility:  [address] [place] [address] Service: skilled nursing facility (31)    Primary diagnosis for wound-care consultation: Unstageable pressure injury to coccyx    Additional ulcer(s) noted? None     History of Present Illness: Unstageable pressure injury to coccyx. DTI has resolved. Debrided of fibrin and biofilm. Healing slowly with current treatment. She is offloading. Low-air-loss mattress. Side to side as much as possible. Appetite is fair to good. Protein supplements provided. No signs of wound infection. No fever or chills. Review of Systems: Pertinent systems reviewed in the HPI; all other systems reviewed, and negative. Pertinent elements of past medical, surgical, family, and/or social history: CVA, flaccid hemiplegia affecting right dominant side, cognitive communication deficit, hypertension, anxiety disorder, bipolar disorder, mixed receptive-expressive language disorder, muscle wasting and atrophy and unspecified dementia without behavioral disturbance.     Medications and allergies are detailed in the nursing home chart, and were reviewed by me today.  _______________________    General Physical exam:    Vital signs:  132/72, 97.5, 66, 18, 95%    General Appearance: alert, expressive aphasia, well developed, in no acute distress  Psychiatric:  Mood and affect appropriate for situation  Skin: warm and dry, no rash  Head: normocephalic and atraumatic  Eyes: pupils equal, round, sclerae anicteric, conjunctivae normal  ENT: no thrush or oral ulcers  Neck:No complaints, normal appearance  Pulmonary/Chest: Respirations easy at rest, no cough or respiratory distress  Cardiovascular: No chest pain, normal rate, toes warm, cap refill normal  Abdomen: No nausea or vomiting  Extremities: no cyanosis, edema or cellulitis  Musculoskeletal: Nonambulatory, right hemiparesis  Neurologic: distal sensation to light touch intact, no allodynia. Wound exam:    Wound location: Coccyx   Length (cm) 2.2   Width (cm) 0.3   Depth (cm) 0.1   Tunneling 0   Undermining 0    Wound type:   Pressure  Grade - stage - thickness: Full thickness     Description of periwound: Intact    Description of wound bed: Wound with red granulation and fibrin. Wound bed moist, moderate amount of serous drainage, edges open and attached. Surrounding tissue and ulcer without signs and symptoms of infection. No purulence, malodor, erythema, increased temperature, or increased pain.   _______________________    Recent labs and data reviewed: No new labs  _______________________     Mathieu Palma diagnoses & assessment: Unstageable pressure injury to coccyx. DTI has resolved. Debrided of fibrin and biofilm. Healing slowly with current treatment. She is offloading. Low-air-loss mattress. Side to side as much as possible. Appetite is fair to good. Protein supplements provided. No signs of wound infection. Debridement is  indicated today, based on the history and exam above.  _______________________    Procedure:    Consent obtained. Time out performed per [facility]. Topical anesthetic applied: 5% topical lidocaine. Using a curette, I sharply debrided the coccyx ulcer(s) down through and including the removal of epidermis and dermis. The type(s) of tissue debrided included fibrin and biofilm. Total Surface Area Debrided: 0.66 sq cm. The ulcers were then irrigated with normal saline solution. The procedure was completed with a small amount of bleeding, and hemostasis was by pressure. The patient tolerated the procedure well, with no significant complications. The patient's level of pain during and after the procedure was monitored.  If post-debridement measurements are significantly different from initial measurements, those will be noted here.   _______________________    Recommendations:    - Dressings / Compression / Offloading: Triad, alginate AG and hydrocolloid. Side to side as much as possible when in bed. Low-air-loss mattress. In chair no more than 2 to 3 hours at a time if possible.   Pressure redistribution chair cushion.    - Labs / Diagnostic studies: None    - Medications / nutritional support: ProMod and Ensure daily    - Further Consultations recommended: Working with OT, PT and ST    - Anticipated follow-up: Weekly evaluation  _______________________    Electronically signed by EAMON Carlos CNP on 8/5/2022 at 7:34 AM

## 2022-08-17 ENCOUNTER — OUTSIDE SERVICES (OUTPATIENT)
Dept: WOUND CARE | Age: 87
End: 2022-08-17
Payer: COMMERCIAL

## 2022-08-17 DIAGNOSIS — L89.150 PRESSURE INJURY OF COCCYGEAL REGION, UNSTAGEABLE (HCC): Primary | ICD-10-CM

## 2022-08-17 PROCEDURE — 99308 SBSQ NF CARE LOW MDM 20: CPT | Performed by: CLINICAL NURSE SPECIALIST

## 2022-08-18 NOTE — PROGRESS NOTES
[facility]    Patient name: Bruce Schreiber  :   0-  Facility:  [address] [place] [address] of Service: skilled nursing facility (31)    Primary diagnosis for wound-care consultation: Unstageable pressure injury to coccyx    Additional ulcer(s) noted? None    History of Present Illness: Unstageable pressure injury coccyx, resolved today. She is offloading. Low-air-loss mattress in place. Side to side as much as possible. Appetite is fair to good. Protein supplements provided. No signs of infection. No fever or chills. Review of Systems: Pertinent systems reviewed in the HPI; all other systems reviewed, and negative. Pertinent elements of past medical, surgical, family, and/or social history: CVA, flaccid hemiplegia affecting right dominant side, cognitive communication deficit, hypertension, anxiety disorder, bipolar disorder, mixed receptive-expressive language disorder, muscle wasting and atrophy and unspecified dementia without behavioral disturbance    Medications and allergies are detailed in the nursing home chart, and were reviewed by me today.  _______________________    General Physical exam:    Vital signs:  132/72, 97.7, 88, 16, 94%    General Appearance: alert, expressive aphasia, well developed and well-nourished, in no acute distress  Psychiatric:  Mood and affect appropriate for situation  Skin: warm and dry, no rash  Head: normocephalic and atraumatic  Eyes: pupils equal, round, sclerae anicteric, conjunctivae normal  ENT: no thrush or oral ulcers  Neck:No complaints, normal appearance  Pulmonary/Chest: Respirations easy at rest, no cough or respiratory distress  Cardiovascular: No chest pain, normal rate, toes warm, cap refill normal  Abdomen: No nausea or vomiting  Extremities: no cyanosis, edema or cellulitis  Musculoskeletal: Nonambulatory, right hemiparesis Neurologic: distal sensation to light touch intact, no allodynia.        Wound exam:    Wound location: Coccyx - Resolved today     Wound type:   Pressure  Grade - stage - thickness: Unstageable     Description of periwound: Intact    Description of wound bed: Wound resolved. Surrounding tissue without signs and symptoms of infection. No purulence, malodor, erythema, increased temperature, or increased pain.   _______________________    Recent labs and data reviewed: No new labs  _______________________     Maia Francisco diagnoses & assessment: Unstageable pressure injury coccyx, resolved today. She is offloading. Low-air-loss mattress in place. Side to side as much as possible. Appetite is fair to good. Protein supplements provided. No signs of infection. Debridement is not indicated today, based on the history and exam above.  _______________________    Procedure:    Consent obtained. Time out performed per [facility]. _______________________    Recommendations:    - Dressings / Compression / Offloading: Triad, alginate AG and hydrocolloid for 2 more weeks then discontinue. Continue low-air-loss mattress. Side to side as much as possible when in bed. Pressure redistribution chair cushion. In chair no more than 2 to 3 hours at a time if possible.     - Labs / Diagnostic studies: None    - Medications / nutritional support: ProMod and Ensure daily    - Further Consultations recommended: OT, PT and ST    - Anticipated follow-up: Nursing to follow  _______________________    Electronically signed by EAMON Glaser CNP on 8/18/2022 at 2:05 PM